# Patient Record
Sex: MALE | Race: WHITE | Employment: OTHER | URBAN - METROPOLITAN AREA
[De-identification: names, ages, dates, MRNs, and addresses within clinical notes are randomized per-mention and may not be internally consistent; named-entity substitution may affect disease eponyms.]

---

## 2018-12-01 ENCOUNTER — OFFICE VISIT (OUTPATIENT)
Dept: INTERNAL MEDICINE CLINIC | Facility: CLINIC | Age: 68
End: 2018-12-01
Payer: MEDICARE

## 2018-12-01 VITALS
WEIGHT: 178 LBS | SYSTOLIC BLOOD PRESSURE: 136 MMHG | DIASTOLIC BLOOD PRESSURE: 74 MMHG | BODY MASS INDEX: 25.48 KG/M2 | HEIGHT: 70 IN | HEART RATE: 67 BPM

## 2018-12-01 DIAGNOSIS — D69.6 THROMBOCYTOPENIA (HCC): ICD-10-CM

## 2018-12-01 DIAGNOSIS — N52.9 ERECTILE DYSFUNCTION, UNSPECIFIED ERECTILE DYSFUNCTION TYPE: ICD-10-CM

## 2018-12-01 DIAGNOSIS — Z00.00 ENCOUNTER FOR ANNUAL HEALTH EXAMINATION: ICD-10-CM

## 2018-12-01 DIAGNOSIS — Z00.00 ANNUAL PHYSICAL EXAM: Primary | ICD-10-CM

## 2018-12-01 PROCEDURE — 90653 IIV ADJUVANT VACCINE IM: CPT | Performed by: INTERNAL MEDICINE

## 2018-12-01 PROCEDURE — G0008 ADMIN INFLUENZA VIRUS VAC: HCPCS | Performed by: INTERNAL MEDICINE

## 2018-12-01 PROCEDURE — 90670 PCV13 VACCINE IM: CPT | Performed by: INTERNAL MEDICINE

## 2018-12-01 PROCEDURE — G0009 ADMIN PNEUMOCOCCAL VACCINE: HCPCS | Performed by: INTERNAL MEDICINE

## 2018-12-01 PROCEDURE — G0438 PPPS, INITIAL VISIT: HCPCS | Performed by: INTERNAL MEDICINE

## 2018-12-01 RX ORDER — IVERMECTIN 10 MG/G
1 CREAM TOPICAL
COMMUNITY
Start: 2018-11-12 | End: 2020-12-04

## 2018-12-01 RX ORDER — MINOCYCLINE HYDROCHLORIDE 50 MG/1
CAPSULE ORAL
Refills: 1 | COMMUNITY
Start: 2018-11-12 | End: 2019-12-02 | Stop reason: ALTCHOICE

## 2018-12-01 NOTE — PROGRESS NOTES
HPI:   Mable Franco. is a 79year old male who presents this morning for a Medicare Initial Annual Wellness visit (Once after 12 month Medicare anniversary) . His last physical was here in February 2015.   This is his first Medicare physical.  H Ability/Status   Rikki Love. has a completely normal functional assessment! Please see flow sheet section for details.       Depression Screening (PHQ-2/PHQ-9): Over the LAST 2 WEEKS   Little interest or pleasure in doing things (over the last t Vitamins-Minerals (MULTIVITAMIN OR) Take 1 tablet by mouth daily. MEDICAL INFORMATION:   He  has a past medical history of Erectile dysfunction and Thrombocytopenia ([facility] [address] (02/2015).     He  has a past surgical history that includes vasectomy (1983) and No   I especially have trouble understanding the speech of women and children:  No I have trouble understanding the speaker in a large room such as at a meeting or place of Judaism:  No   Many people I talk to seem to mumble (or don't speak clearly):   No P CHRONIC CONDITIONS:   Ang Fitzgerald is a 79year old male who presents for a Medicare Assessment. PLAN SUMMARY:   Diagnoses and all orders for this visit:    Annual physical exam  Prevnar and high-dose influenza vaccine today.   Anticipate Pneu Colorectal Cancer Screening      Colonoscopy Screen every 10 years Colonoscopy due on 03/14/1952 Update [facility] if applicable    Flex Sigmoidoscopy Screen every 10 years No results found for this or any previous visit. No flowsheet data found.

## 2018-12-01 NOTE — PATIENT INSTRUCTIONS
You received an influenza vaccine and Prevnar, the first pneumonia shot, today. You should get the second pneumonia shot, Pneumovax, in 1 year. Please also obtain the 2 vaccine series of Shingrix at your local pharmacy.   Continue to follow a healthy diet found for this or any previous visit.  Limited to patients who meet one of the following criteria:   • Men who are 73-68 years old and have smoked more than 100 cigarettes in their lifetime   • Anyone with a family history    Colorectal Cancer Screening Cov retarded   Persons who live in the same house as a HepB virus carrier   Homosexual men   Illicit injectable drug abusers     Tetanus Toxoid- Only covered with a cut with metal- TD and TDaP Not covered by Medicare Part B) No orders found for this or any pre

## 2018-12-06 ENCOUNTER — HOSPITAL ENCOUNTER (OUTPATIENT)
Age: 68
Discharge: HOME | End: 2018-12-06
Attending: FAMILY MEDICINE
Payer: MEDICARE

## 2018-12-06 ENCOUNTER — APPOINTMENT (OUTPATIENT)
Dept: GENERAL RADIOLOGY | Age: 68
End: 2018-12-06
Attending: FAMILY MEDICINE
Payer: MEDICARE

## 2018-12-06 VITALS
RESPIRATION RATE: 17 BRPM | WEIGHT: 175 LBS | HEART RATE: 65 BPM | TEMPERATURE: 98 F | SYSTOLIC BLOOD PRESSURE: 136 MMHG | BODY MASS INDEX: 25.05 KG/M2 | OXYGEN SATURATION: 100 % | DIASTOLIC BLOOD PRESSURE: 74 MMHG | HEIGHT: 70 IN

## 2018-12-06 DIAGNOSIS — M10.9 ACUTE GOUT INVOLVING TOE OF LEFT FOOT, UNSPECIFIED CAUSE: Primary | ICD-10-CM

## 2018-12-06 PROCEDURE — 73660 X-RAY EXAM OF TOE(S): CPT | Performed by: FAMILY MEDICINE

## 2018-12-06 PROCEDURE — 99214 OFFICE O/P EST MOD 30 MIN: CPT

## 2018-12-06 PROCEDURE — 99213 OFFICE O/P EST LOW 20 MIN: CPT

## 2018-12-06 RX ORDER — PREDNISONE 20 MG/1
40 TABLET ORAL DAILY
Qty: 10 TABLET | Refills: 0 | Status: SHIPPED | OUTPATIENT
Start: 2018-12-06 | End: 2018-12-11

## 2018-12-06 NOTE — ED INITIAL ASSESSMENT (HPI)
Patient complains of left foot pain that started less than 24 hours ago. Patient denies any trauma to the area. Patient states pain is right under big toe. Painful when he puts pressure on it such as walking. Area is swollen. No discoloration noted.

## 2018-12-06 NOTE — ED PROVIDER NOTES
Patient Seen in: [address]    History   Patient presents with: Foot Pain    Stated Complaint: left foot pain    HPI    Patient is here with left big toe pain. At the base of the toe. Since last night. Sudden onset. normal distal cap refill of the left foot. Normal peripheral pulses. Neurological: He is alert and oriented to person, place, and time. No sensory deficit. He exhibits normal muscle tone. Skin: Skin is warm. Capillary refill takes less than 2 seconds.

## 2019-11-11 ENCOUNTER — OFFICE VISIT (OUTPATIENT)
Dept: PHYSICAL THERAPY | Facility: HOSPITAL | Age: 69
End: 2019-11-11
Attending: ORTHOPAEDIC SURGERY
Payer: MEDICARE

## 2019-11-11 DIAGNOSIS — M89.8X7 EXOSTOSIS OF CALCANEUS: ICD-10-CM

## 2019-11-11 PROCEDURE — 97110 THERAPEUTIC EXERCISES: CPT

## 2019-11-11 PROCEDURE — 97161 PT EVAL LOW COMPLEX 20 MIN: CPT

## 2019-11-11 NOTE — PROGRESS NOTES
LOWER EXTREMITY EVALUATION:   Referring Physician: Dr. Caitlyn San  Diagnosis: Posterior Calcaneal Exostosis left  ICD-10 M89.8x7,  Yvan's with insertional Achilles Tendonitis  Precautions: Yvan's disease, Left  Insurance : medicare  Date of Onset: Ea Past medical history was reviewed with Will.  Significant findings include : 2006, removal of exostosis right heel, childhood osgood schlatter bilateral, Yvan's deformity bilateral,  Thrombocytopenia      Pt marked \"no\" for all questions relating to pe IR: R 30;  L 25 Flexion: R 125; L 130  Extension: R 0; L -2    DF: R 5; L 3  PF: R WNL; L WNL  INV: R WNL; L WNL  EV: R WNL; L WNL  Great toe ext: R 20; L 40         Accessory motion: decreased ankle eversion, bilateral and mid tarsal joint    Flexibility: · Patient will show improved FOTO score to  76/100 or greater.   · Patient will report reduced pain at left  foot/ankle with ADL's by greater than 80% of original max pain  · Patient will demonstrate independent HEP with good tolerance  · Patient will demon

## 2019-11-12 ENCOUNTER — IMMUNIZATION (OUTPATIENT)
Dept: INTERNAL MEDICINE CLINIC | Facility: CLINIC | Age: 69
End: 2019-11-12
Payer: MEDICARE

## 2019-11-12 DIAGNOSIS — Z23 NEED FOR VACCINATION: ICD-10-CM

## 2019-11-12 PROCEDURE — 90662 IIV NO PRSV INCREASED AG IM: CPT | Performed by: INTERNAL MEDICINE

## 2019-11-12 PROCEDURE — G0008 ADMIN INFLUENZA VIRUS VAC: HCPCS | Performed by: INTERNAL MEDICINE

## 2019-11-13 ENCOUNTER — OFFICE VISIT (OUTPATIENT)
Dept: PHYSICAL THERAPY | Facility: HOSPITAL | Age: 69
End: 2019-11-13
Attending: ORTHOPAEDIC SURGERY
Payer: MEDICARE

## 2019-11-13 PROCEDURE — 97140 MANUAL THERAPY 1/> REGIONS: CPT

## 2019-11-13 PROCEDURE — 97110 THERAPEUTIC EXERCISES: CPT

## 2019-11-13 NOTE — PROGRESS NOTES
Referring Physician: Dr. Sawyer Grandchild  Diagnosis: Posterior Calcaneal Exostosis left  ICD-10 M89.8x7,  Yvan's with insertional Achilles Tendonitis  Precautions: Yvan's disease, Left  Insurance : medicare  Date of Onset: Early October, 2019         Margaret Maguire · Patient will demonstrate improved left hip flexor, left quad, left gastroc, left soleus, left hamstring flexibility to reduce mechanical strain at left achilles with independent HEP for flexibility.    · Patient will demonstrate normalcy of left ankle hin

## 2019-11-19 ENCOUNTER — OFFICE VISIT (OUTPATIENT)
Dept: PHYSICAL THERAPY | Facility: HOSPITAL | Age: 69
End: 2019-11-19
Attending: ORTHOPAEDIC SURGERY
Payer: MEDICARE

## 2019-11-19 PROCEDURE — 97140 MANUAL THERAPY 1/> REGIONS: CPT

## 2019-11-19 PROCEDURE — 97110 THERAPEUTIC EXERCISES: CPT

## 2019-11-19 NOTE — PROGRESS NOTES
Referring Physician: Dr. Betty Salinas  Diagnosis: Posterior Calcaneal Exostosis left  ICD-10 M89.8x7,  Yvan's with insertional Achilles Tendonitis  Precautions: Yvan's disease, Left  Insurance : medicare  Date of Onset: Early October, 2019         Harish King Assessment: Good response with therapy. Patient c/o no pain with treatment, added hamstring/calf  stretch and hip abduction exercises. Patient has very tight both hamstring and calf and decreased both hip strength.  Very significant rigid cavus foot type pr

## 2019-11-21 ENCOUNTER — OFFICE VISIT (OUTPATIENT)
Dept: PHYSICAL THERAPY | Facility: HOSPITAL | Age: 69
End: 2019-11-21
Attending: ORTHOPAEDIC SURGERY
Payer: MEDICARE

## 2019-11-21 PROCEDURE — 97110 THERAPEUTIC EXERCISES: CPT

## 2019-11-21 NOTE — PROGRESS NOTES
Referring Physician: Dr. Marzena Johnson  Diagnosis: Posterior Calcaneal Exostosis left  ICD-10 M89.8x7,  Yvan's with insertional Achilles Tendonitis  Precautions: Yvan's disease, Left  Insurance : medicare  Date of Onset: Early October, 2019         Matias Pineda Manual therapy;   -posterior PA glides talocrural joint left  -long axis distraction left ankle  -gentle STM to left achilles  -mid tarsel mobilizations grade III  -metatarsel mobs A/P   Manual therapy;   -posterior PA glides talocrural joint left Plan: Continued to PT for left ankle ROM, stretching, Strengthening, continue joint mobility, global hip strengthening and HEP training.   Charges: TE x 3  Total Timed Treatment: 45 min   Total Treatment Time: 47 min

## 2019-11-27 ENCOUNTER — OFFICE VISIT (OUTPATIENT)
Dept: PHYSICAL THERAPY | Facility: HOSPITAL | Age: 69
End: 2019-11-27
Attending: ORTHOPAEDIC SURGERY
Payer: MEDICARE

## 2019-11-27 PROCEDURE — 97110 THERAPEUTIC EXERCISES: CPT

## 2019-11-27 NOTE — PROGRESS NOTES
Referring Physician: Dr. Sada Newton  Diagnosis: Posterior Calcaneal Exostosis left  ICD-10 M89.8x7,  Yvan's with insertional Achilles Tendonitis  Precautions: Yvan's disease, Left  Insurance : medicare  Date of Onset: Early October, 2019         Odette Oglesby Beltran hopper A/P, M/L x15 ea  -Standing calf stretch 2 x 15 sec hold    -Seated DF/PF x 20  -Seated ankle CW/CCW  x15  -Seated toe crunch x 15    Manual therapy;   -posterior PA glides talocrural joint left  -long axis distraction left ankle  -gentle STM

## 2019-12-02 ENCOUNTER — OFFICE VISIT (OUTPATIENT)
Dept: INTERNAL MEDICINE CLINIC | Facility: CLINIC | Age: 69
End: 2019-12-02
Payer: MEDICARE

## 2019-12-02 VITALS
HEART RATE: 66 BPM | DIASTOLIC BLOOD PRESSURE: 70 MMHG | BODY MASS INDEX: 25.03 KG/M2 | HEIGHT: 69 IN | SYSTOLIC BLOOD PRESSURE: 132 MMHG | WEIGHT: 169 LBS

## 2019-12-02 DIAGNOSIS — Z12.5 PROSTATE CANCER SCREENING: ICD-10-CM

## 2019-12-02 DIAGNOSIS — Z00.00 ANNUAL PHYSICAL EXAM: Primary | ICD-10-CM

## 2019-12-02 DIAGNOSIS — Z00.00 ENCOUNTER FOR ANNUAL HEALTH EXAMINATION: ICD-10-CM

## 2019-12-02 DIAGNOSIS — R03.0 ELEVATED BLOOD PRESSURE READING: ICD-10-CM

## 2019-12-02 DIAGNOSIS — D69.6 THROMBOCYTOPENIA (HCC): ICD-10-CM

## 2019-12-02 PROCEDURE — 90732 PPSV23 VACC 2 YRS+ SUBQ/IM: CPT | Performed by: INTERNAL MEDICINE

## 2019-12-02 PROCEDURE — G0009 ADMIN PNEUMOCOCCAL VACCINE: HCPCS | Performed by: INTERNAL MEDICINE

## 2019-12-02 PROCEDURE — G0439 PPPS, SUBSEQ VISIT: HCPCS | Performed by: INTERNAL MEDICINE

## 2019-12-02 NOTE — PATIENT INSTRUCTIONS
ICU End of Shift Summary.  For vital signs and complete assessments, please see documentation flowsheets.     Pertinent assessments: Pt pulling bipap of repeatedly during the night.  Stated that he is done, wants to die-writer explained that a discussion need to occur between patient, wife and MD.  BIpap or NC 2 LPM, LS diminished.  Edema 1-2+ everywhere.  Good urine output.  Major Shift Events: up to BSC with 2  Plan (Upcoming Events): try to wean off bipap  Discharge/Transfer Needs: ?LTACH vs comfort care    Bedside Shift Report Completed : y  Bedside Safety Check Completed: y    .   Your physical today was normal.  You received a Pneumovax, the second pneumonia shot, today. Please obtain blood tests soon when you can. Continue healthy diet and regular exercise.   Annual Medicare physical.  Mary Douglas Jr.'s SCREENING SCHEDULE years old and have smoked more than 100 cigarettes in their lifetime   • Anyone with a family history    Colorectal Cancer Screening Covered up to Age 76     Colonoscopy Screen   Covered every 10 years- more often if abnormal Colonoscopy due on 03/14/2002 HepB virus carrier   Homosexual men   Illicit injectable drug abusers     Tetanus Toxoid- Only covered with a cut with metal- TD and TDaP Not covered by Medicare Part B) No orders found for this or any previous visit.  This may be covered with your prescrip

## 2019-12-02 NOTE — PROGRESS NOTES
HPI:   Mable Franco. is a 81year old male who presents for a Medicare Subsequent Annual Wellness visit (Pt already had Initial Annual Wellness). His last visit here was for a Medicare physical December 2018. Lately he has been feeling well. Care Team: Patient Care Team:  Nadiya Medrano MD as PCP - General (Internal Medicine)  Blessing Cade [facility]  Kelsie Leon (SURGERY, [facility])  Debo Napoles, PT as Physical Therapist (Physical Therapy)  Yelitza Busby PTA as Physical Ther SYSTEMS:   GENERAL: No fever  LUNGS: No cough wheezing or shortness of breath  CARDIAC: No lightheadedness palpitations or chest pain  GI: No anorexia heartburn dysphagia nausea vomiting abdominal pain diarrhea constipation or rectal bleeding  : No urina Visual Acuity  Right Eye Visual Acuity: Corrected Right Eye Chart Acuity: 20/25   Left Eye Visual Acuity: Corrected Left Eye Chart Acuity: 20/40   Both Eyes Visual Acuity: Corrected Both Eyes Chart Acuity: 20/25   Able To Tolerate Visual Acuity: Yes DIFFERENTIAL; Future    Elevated blood pressure reading  Initial BP today high. Repeat BP normal.  -     COMP METABOLIC PANEL (14); Future  -     LIPID PANEL;  Future    Prostate cancer screening  Await PSA  -     PSA SCREEN; Future    Other orders  - Glaucoma, AA>50, > 65 No flowsheet data found.     Prostate Cancer Screening      PSA  Annually PSA due on 02/12/2017  Update Health Maintenance if applicable     Immunizations (Update Immunization Activity if applicable)     Influenza  Covered Padmini

## 2019-12-03 ENCOUNTER — OFFICE VISIT (OUTPATIENT)
Dept: PHYSICAL THERAPY | Facility: HOSPITAL | Age: 69
End: 2019-12-03
Attending: ORTHOPAEDIC SURGERY
Payer: MEDICARE

## 2019-12-03 PROCEDURE — 97110 THERAPEUTIC EXERCISES: CPT

## 2019-12-03 NOTE — PROGRESS NOTES
Referring Physician: Dr. Caitlyn San  Diagnosis: Posterior Calcaneal Exostosis left  ICD-10 M89.8x7,  Yvan's with insertional Achilles Tendonitis  Precautions: Yvan's disease, Left  Insurance : medicare  Date of Onset: Early October, 2019         Luis Enrique Tijerina -Standing heel raises x 15  -Step hamstring stretch 4 x 30 sec hold  -Step calf stretch 10  x10 sec hold   -YIMI L3 calf stretch x 3 min  -[place] board A/P, M/L x15 ea  [place] board S/L A/P x20 L  -BAPS board SL CW/CCW x 15 ea L       Manual Therapy:    Yanira Mcmillan Charges: TE x 3  Total Timed Treatment: 45 min   Total Treatment Time: 47 min

## 2019-12-06 ENCOUNTER — OFFICE VISIT (OUTPATIENT)
Dept: PHYSICAL THERAPY | Facility: HOSPITAL | Age: 69
End: 2019-12-06
Attending: ORTHOPAEDIC SURGERY
Payer: MEDICARE

## 2019-12-06 PROCEDURE — 97110 THERAPEUTIC EXERCISES: CPT

## 2019-12-06 NOTE — PROGRESS NOTES
Referring Physician: Dr. Mejia Payer  Diagnosis: Posterior Calcaneal Exostosis left  ICD-10 M89.8x7,  Yvan's with insertional Achilles Tendonitis  Precautions: Yvan's disease, Left  Insurance : medicare  Date of Onset: Early October, 2019     Singh -[place] board A/P, M/L x15 ea  [place] board S/L A/P x20 L  -BAPS board SL CW/CCW x 15 ea L       TE:     -YIMI L3 calf  Stretch 3 x 30 secs  -BAPS board SL CW/CCW x 30 ea L and   BAPS with 12 and 6 oclock and 3 and 9 oclock with opposite foot on ground  - · Patient will demonstrate leftAnkle DF AROM to 12 degrees to allow for reduction in strain at left achilles insertion or left  foot.-Partially MET (No c/o pain with stair climbing)      Plan: Continued to PT for left ankle ROM, stretching, Strengthening,

## 2019-12-10 ENCOUNTER — OFFICE VISIT (OUTPATIENT)
Dept: PHYSICAL THERAPY | Facility: HOSPITAL | Age: 69
End: 2019-12-10
Attending: ORTHOPAEDIC SURGERY
Payer: MEDICARE

## 2019-12-10 PROCEDURE — 97110 THERAPEUTIC EXERCISES: CPT

## 2019-12-10 NOTE — PROGRESS NOTES
POSSIBLE DC NOTE  Referring Physician: Dr. Irais Agrawal  Diagnosis: Posterior Calcaneal Exostosis left  ICD-10 M89.8x7,  Yvan's with insertional Achilles Tendonitis  Precautions: Yvan's disease, Left  Insurance : medicare  Date of Onset: Early October, · Patient will demonstrate improved left hip flexor, left quad, left gastroc, left soleus, left hamstring flexibility to reduce mechanical strain at left achilles with independent HEP for flexibility. -Partially MET  · Patient will demonstrate normalcy of -Seated ankle CW/CCW  x15  -Seated toe crunch x 15 TE:    -Nu Step L6 x8 min seat #10  -Shuttle DL 50# x 5 min, calf stretch x2 min  -Standing heel raises x 15  -Step hamstring stretch 4 x 30 sec hold  -Step calf stretch 10  x10 sec hold   -YIMI L3 calf s

## 2019-12-16 NOTE — PROGRESS NOTES
POSSIBLE DC NOTE  Referring Physician: Dr. Betty Salinas  Diagnosis: Posterior Calcaneal Exostosis left  ICD-10 M89.8x7,  Yvan's with insertional Achilles Tendonitis  Precautions: Yvan's disease, Left  Insurance : medicare  Date of Onset: Early October, · Patient will demonstrate improved left hip flexor, left quad, left gastroc, left soleus, left hamstring flexibility to reduce mechanical strain at left achilles with independent HEP for flexibility. -Partially MET  · Patient will demonstrate normalcy of -Seated ankle CW/CCW  x15  -Seated toe crunch x 15 TE:    -Nu Step L6 x8 min seat #10  -Shuttle DL 50# x 5 min, calf stretch x2 min  -Standing heel raises x 15  -Step hamstring stretch 4 x 30 sec hold  -Step calf stretch 10  x10 sec hold   -YIMI L3 calf s

## 2019-12-23 ENCOUNTER — APPOINTMENT (OUTPATIENT)
Dept: LAB | Facility: HOSPITAL | Age: 69
End: 2019-12-23
Attending: INTERNAL MEDICINE
Payer: MEDICARE

## 2019-12-23 DIAGNOSIS — D69.6 THROMBOCYTOPENIA (HCC): ICD-10-CM

## 2019-12-23 DIAGNOSIS — Z12.5 PROSTATE CANCER SCREENING: ICD-10-CM

## 2019-12-23 DIAGNOSIS — R03.0 ELEVATED BLOOD PRESSURE READING: ICD-10-CM

## 2019-12-23 PROCEDURE — 36415 COLL VENOUS BLD VENIPUNCTURE: CPT

## 2019-12-23 PROCEDURE — 80053 COMPREHEN METABOLIC PANEL: CPT

## 2019-12-23 PROCEDURE — 85027 COMPLETE CBC AUTOMATED: CPT

## 2019-12-23 PROCEDURE — 80061 LIPID PANEL: CPT

## 2020-09-11 ENCOUNTER — OFFICE VISIT (OUTPATIENT)
Dept: INTERNAL MEDICINE CLINIC | Facility: CLINIC | Age: 70
End: 2020-09-11
Payer: MEDICARE

## 2020-09-11 VITALS
DIASTOLIC BLOOD PRESSURE: 78 MMHG | BODY MASS INDEX: 24.59 KG/M2 | SYSTOLIC BLOOD PRESSURE: 132 MMHG | WEIGHT: 166 LBS | HEART RATE: 68 BPM | HEIGHT: 69 IN | RESPIRATION RATE: 20 BRPM

## 2020-09-11 DIAGNOSIS — M54.2 CERVICALGIA: Primary | ICD-10-CM

## 2020-09-11 PROCEDURE — G0463 HOSPITAL OUTPT CLINIC VISIT: HCPCS | Performed by: INTERNAL MEDICINE

## 2020-09-11 PROCEDURE — 99213 OFFICE O/P EST LOW 20 MIN: CPT | Performed by: INTERNAL MEDICINE

## 2020-09-11 PROCEDURE — G0008 ADMIN INFLUENZA VIRUS VAC: HCPCS | Performed by: INTERNAL MEDICINE

## 2020-09-11 PROCEDURE — 90653 IIV ADJUVANT VACCINE IM: CPT | Performed by: INTERNAL MEDICINE

## 2020-09-11 NOTE — PROGRESS NOTES
Dollie Baumgarten. is a 78year old male. Patient presents with:  Neck Pain    HPI:   For the past 4 days he has had persistent left-sided posterior neck pain, worse with head movement. He recalls no recent injury or unusual activity.   He has been res triceps and bilateral brachioradialis. Strength 5/5 bilateral upper extremities without weakness      ASSESSMENT AND PLAN:   1. Cervicalgia  Likely musculoskeletal.  No evidence of cervical radiculopathy.   Recommend rest and heat application 2-3 times jack

## 2020-09-11 NOTE — PATIENT INSTRUCTIONS
Please rest your neck and apply heat 2-3 times daily. Schedule physical therapy. You received a high-dose flu shot today. Call if no better.

## 2020-09-29 ENCOUNTER — OFFICE VISIT (OUTPATIENT)
Dept: PHYSICAL THERAPY | Facility: HOSPITAL | Age: 70
End: 2020-09-29
Attending: INTERNAL MEDICINE
Payer: MEDICARE

## 2020-09-29 DIAGNOSIS — M54.2 CERVICALGIA: ICD-10-CM

## 2020-09-29 PROCEDURE — 97110 THERAPEUTIC EXERCISES: CPT

## 2020-09-29 PROCEDURE — 97162 PT EVAL MOD COMPLEX 30 MIN: CPT

## 2020-09-29 NOTE — PROGRESS NOTES
CERVICAL SPINE EVALUATION:   Referring Physician: Dr. Bain Common  Diagnosis: Cervicalgia (M54.2)  Date of Service: 9/29/2020     PATIENT SUMMARY   Reilly Butts. is a 78year old y/o male who presents to therapy today with complaints of L sided cent pain. Meche Mccall. will have initial interventions focus on cervical FLX and posterior chain strengthening to improve posture and decrease his pain levels.          Monda Goldberg would benefit from skilled Physical Therapy to address the above impairmen treatment diagnosis, treatment plan, expectations, and prognosis.  Pt was also provided recommendations for pt/ot/slp education: activity modifications, possible soreness after evaluation, modalities as needed [ice/heat], postural corrections, ergonomics an [phone]    Sincerely,  Electronically signed by therapist: Shaun Quiñones, PT, DPT, CSCS      [de-identified] certification required: Yes  I certify the need for these services furnished under this plan of treatment and while under my c

## 2020-10-01 ENCOUNTER — OFFICE VISIT (OUTPATIENT)
Dept: PHYSICAL THERAPY | Facility: HOSPITAL | Age: 70
End: 2020-10-01
Attending: INTERNAL MEDICINE
Payer: MEDICARE

## 2020-10-01 PROCEDURE — 97110 THERAPEUTIC EXERCISES: CPT

## 2020-10-01 PROCEDURE — 97140 MANUAL THERAPY 1/> REGIONS: CPT

## 2020-10-01 NOTE — PROGRESS NOTES
Dx: Cervicalgia (M54.2)          Authorized # of Visits:  2/10         Next MD visit: 12/4/2020   Fall Risk: Standard      Precautions: N/A         Subjective:  Pt reports compliance with HEP.  Decreased neck pain  Objective:   See below    Date   10/1/2020

## 2020-10-07 ENCOUNTER — OFFICE VISIT (OUTPATIENT)
Dept: PHYSICAL THERAPY | Facility: HOSPITAL | Age: 70
End: 2020-10-07
Attending: INTERNAL MEDICINE
Payer: MEDICARE

## 2020-10-07 PROCEDURE — 97140 MANUAL THERAPY 1/> REGIONS: CPT

## 2020-10-07 PROCEDURE — 97110 THERAPEUTIC EXERCISES: CPT

## 2020-10-07 NOTE — PROGRESS NOTES
Dx: Cervicalgia (M54.2)          Authorized # of Visits:  3/10         Next MD visit: 12/4/2020   Fall Risk: Standard      Precautions: N/A         Subjective:  Pt reports compliance with HEP.  Decreased neck pain  Objective:   See below    Date   10/1/2020

## 2020-10-08 ENCOUNTER — APPOINTMENT (OUTPATIENT)
Dept: PHYSICAL THERAPY | Facility: HOSPITAL | Age: 70
End: 2020-10-08
Attending: INTERNAL MEDICINE
Payer: MEDICARE

## 2020-10-09 ENCOUNTER — OFFICE VISIT (OUTPATIENT)
Dept: PHYSICAL THERAPY | Facility: HOSPITAL | Age: 70
End: 2020-10-09
Attending: INTERNAL MEDICINE
Payer: MEDICARE

## 2020-10-09 PROCEDURE — 97110 THERAPEUTIC EXERCISES: CPT

## 2020-10-09 NOTE — PROGRESS NOTES
Dx: Cervicalgia (M54.2)          Authorized # of Visits:  3/10         Next MD visit: 12/4/2020   Fall Risk: Standard      Precautions: N/A         Subjective:  Pt reports his chest was very sore post last session.   Objective:   See below    Date   10/1/20

## 2020-10-10 ENCOUNTER — APPOINTMENT (OUTPATIENT)
Dept: PHYSICAL THERAPY | Facility: HOSPITAL | Age: 70
End: 2020-10-10
Attending: INTERNAL MEDICINE
Payer: MEDICARE

## 2020-10-13 ENCOUNTER — OFFICE VISIT (OUTPATIENT)
Dept: PHYSICAL THERAPY | Facility: HOSPITAL | Age: 70
End: 2020-10-13
Attending: INTERNAL MEDICINE
Payer: MEDICARE

## 2020-10-13 PROCEDURE — 97110 THERAPEUTIC EXERCISES: CPT

## 2020-10-13 NOTE — PROGRESS NOTES
Dx: Cervicalgia (M54.2)          Authorized # of Visits:  5/10         Next MD visit: 12/4/2020   Fall Risk: Standard      Precautions: N/A         Subjective:  Pt reports near pain free status today   Objective:   See below    Date   10/1/2020  10/7/2020

## 2020-10-14 ENCOUNTER — APPOINTMENT (OUTPATIENT)
Dept: PHYSICAL THERAPY | Facility: HOSPITAL | Age: 70
End: 2020-10-14
Attending: INTERNAL MEDICINE
Payer: MEDICARE

## 2020-10-15 ENCOUNTER — OFFICE VISIT (OUTPATIENT)
Dept: PHYSICAL THERAPY | Facility: HOSPITAL | Age: 70
End: 2020-10-15
Attending: INTERNAL MEDICINE
Payer: MEDICARE

## 2020-10-15 PROCEDURE — 97110 THERAPEUTIC EXERCISES: CPT

## 2020-10-15 NOTE — PROGRESS NOTES
Dx: Cervicalgia (M54.2)          Authorized # of Visits:  5/10         Next MD visit: 12/4/2020   Fall Risk: Standard      Precautions: N/A         Subjective:  Pt reports pain free status.    Objective:   See below    Date   10/1/2020  10/7/2020  10/9/2020 positive health benefits of exercise.   Plan:  Continue with POC with focus on cervical FLX and thoracic mobility    Charges: Yeny 3  Total Timed Treatment: 45 min     Total Treatment Time: 45 min

## 2020-10-16 ENCOUNTER — APPOINTMENT (OUTPATIENT)
Dept: PHYSICAL THERAPY | Facility: HOSPITAL | Age: 70
End: 2020-10-16
Attending: INTERNAL MEDICINE
Payer: MEDICARE

## 2020-10-20 ENCOUNTER — OFFICE VISIT (OUTPATIENT)
Dept: PHYSICAL THERAPY | Facility: HOSPITAL | Age: 70
End: 2020-10-20
Attending: INTERNAL MEDICINE
Payer: MEDICARE

## 2020-10-20 PROCEDURE — 97110 THERAPEUTIC EXERCISES: CPT

## 2020-10-20 NOTE — PROGRESS NOTES
Dx: Cervicalgia (M54.2)          Authorized # of Visits:  7/10         Next MD visit: 12/4/2020   Fall Risk: Standard      Precautions: N/A         Subjective:  Pt reports he feels his Tspine is moving better today  Objective:   See below    Date   10/1/20 household ADL performance. [facility] Simran Casas. will report he is able to exercise with 2/10 pain or less for the positive health benefits of exercise.   Plan:  Continue with POC with focus on cervical FLX and thoracic mobility    Charges: Yeny 3  Tota

## 2020-10-21 ENCOUNTER — APPOINTMENT (OUTPATIENT)
Dept: PHYSICAL THERAPY | Facility: HOSPITAL | Age: 70
End: 2020-10-21
Attending: INTERNAL MEDICINE
Payer: MEDICARE

## 2020-10-23 ENCOUNTER — OFFICE VISIT (OUTPATIENT)
Dept: PHYSICAL THERAPY | Facility: HOSPITAL | Age: 70
End: 2020-10-23
Attending: INTERNAL MEDICINE
Payer: MEDICARE

## 2020-10-23 PROCEDURE — 97110 THERAPEUTIC EXERCISES: CPT

## 2020-10-23 NOTE — PROGRESS NOTES
Dx: Cervicalgia (M54.2)          Authorized # of Visits:  7/10         Next MD visit: 12/4/2020   Fall Risk: Standard      Precautions: N/A         Subjective:  Pt reports he feels his Tspine is moving better today  Objective:   See below    Date   10/1/20 Justin Santillan. will report he is able to complete tasks such as yard work with 2/10 pain or less to improve household ADL performance.   [facility] Justin Santillan. will report he is able to exercise with 2/10 pain or less for the positive health benefits of ex

## 2020-10-24 ENCOUNTER — APPOINTMENT (OUTPATIENT)
Dept: PHYSICAL THERAPY | Facility: HOSPITAL | Age: 70
End: 2020-10-24
Attending: INTERNAL MEDICINE
Payer: MEDICARE

## 2020-10-29 ENCOUNTER — APPOINTMENT (OUTPATIENT)
Dept: PHYSICAL THERAPY | Facility: HOSPITAL | Age: 70
End: 2020-10-29
Attending: PHYSICAL MEDICINE & REHABILITATION
Payer: MEDICARE

## 2020-10-29 ENCOUNTER — TELEPHONE (OUTPATIENT)
Dept: PHYSICAL THERAPY | Facility: HOSPITAL | Age: 70
End: 2020-10-29

## 2020-11-02 ENCOUNTER — MED REC SCAN ONLY (OUTPATIENT)
Dept: INTERNAL MEDICINE CLINIC | Facility: CLINIC | Age: 70
End: 2020-11-02

## 2020-11-04 ENCOUNTER — OFFICE VISIT (OUTPATIENT)
Dept: PHYSICAL THERAPY | Facility: HOSPITAL | Age: 70
End: 2020-11-04
Attending: PHYSICAL MEDICINE & REHABILITATION
Payer: MEDICARE

## 2020-11-04 PROCEDURE — 97110 THERAPEUTIC EXERCISES: CPT

## 2020-11-04 NOTE — PROGRESS NOTES
CERVICAL SPINE EVALUATION:   Referring Physician: Dr. Ny Beltre  Diagnosis: Cervicalgia (M54.2)  Date of Service: 11/4/2020      PATIENT SUMMARY   Romeo Snider is a 78year old y/o male who reports pain free status.  Able to complete all housework Mayraellen Maciass. is a 78year old with a diagnosis of Cervicalgia (M54.2) who has been seen 9x. Chon Sultana has achieved all LTGs and is currently pain free.  Chon Sultana is appropriate for D/C at this time, had all questions answered, HEP pt/ot/slp education: activity modifications, possible soreness after evaluation, modalities as needed [ice/heat], postural corrections, ergonomics and pain science education .   Patient was instructed in and issued a HEP for:     Home Exercise Program    [facility] for your referral. Please co-sign or sign and return this letter via fax as soon as possible to [phone].  If you have any questions, please contact me at Dept: [phone]    Sincerely,  Electronically signed by therapist: Nolvia Song

## 2020-12-04 ENCOUNTER — OFFICE VISIT (OUTPATIENT)
Dept: INTERNAL MEDICINE CLINIC | Facility: CLINIC | Age: 70
End: 2020-12-04
Payer: MEDICARE

## 2020-12-04 VITALS
BODY MASS INDEX: 24.64 KG/M2 | DIASTOLIC BLOOD PRESSURE: 74 MMHG | HEIGHT: 69 IN | SYSTOLIC BLOOD PRESSURE: 120 MMHG | WEIGHT: 166.38 LBS | RESPIRATION RATE: 18 BRPM | HEART RATE: 70 BPM

## 2020-12-04 DIAGNOSIS — Z00.00 ENCOUNTER FOR ANNUAL HEALTH EXAMINATION: ICD-10-CM

## 2020-12-04 DIAGNOSIS — Z12.5 PROSTATE CANCER SCREENING: ICD-10-CM

## 2020-12-04 DIAGNOSIS — M54.2 CERVICALGIA: ICD-10-CM

## 2020-12-04 DIAGNOSIS — D69.6 THROMBOCYTOPENIA (HCC): ICD-10-CM

## 2020-12-04 DIAGNOSIS — Z00.00 ANNUAL PHYSICAL EXAM: Primary | ICD-10-CM

## 2020-12-04 PROCEDURE — 99213 OFFICE O/P EST LOW 20 MIN: CPT | Performed by: INTERNAL MEDICINE

## 2020-12-04 PROCEDURE — G0439 PPPS, SUBSEQ VISIT: HCPCS | Performed by: INTERNAL MEDICINE

## 2020-12-04 PROCEDURE — G0463 HOSPITAL OUTPT CLINIC VISIT: HCPCS | Performed by: INTERNAL MEDICINE

## 2020-12-04 RX ORDER — MELOXICAM 7.5 MG/1
TABLET ORAL
COMMUNITY
Start: 2020-10-15 | End: 2020-12-04

## 2020-12-04 RX ORDER — MELOXICAM 7.5 MG/1
TABLET ORAL
COMMUNITY
Start: 2020-09-16 | End: 2020-12-04

## 2020-12-04 RX ORDER — FLUOROURACIL 50 MG/G
CREAM TOPICAL
COMMUNITY
Start: 2020-10-27 | End: 2020-12-04 | Stop reason: ALTCHOICE

## 2020-12-04 RX ORDER — METRONIDAZOLE 7.5 MG/G
LOTION TOPICAL
COMMUNITY
Start: 2020-10-30 | End: 2022-01-04 | Stop reason: ALTCHOICE

## 2020-12-04 NOTE — PROGRESS NOTES
HPI:   Radha Cook. is a 78year old male who presents this morning for a Medicare Subsequent Annual Wellness visit (Pt already had Initial Annual Wellness). His last Medicare physical was December 2019. Lately he has been feeling well.   No s has never smoked tobacco.    CAGE Alcohol screening   Mable Franco. was screened for Alcohol abuse and had a score of 0 so is at low risk.      Patient Care Team: Patient Care Team:  Kirt Diaz MD as PCP - General (Internal Medicine)  Dayday Meyers his sister; Stroke in his mother; Tongue Cancer in his mother. SOCIAL HISTORY:   He  reports that he has never smoked. He has never used smokeless tobacco. He reports that he does not drink alcohol or use drugs.      REVIEW OF SYSTEMS:     REVIEW OF SYSTE No I avoid social activities because I cannot hear well and fear I will reply improperly: No   Family members and friends have told me they think I may have hearing loss:  No                  Visual Acuity  Right Eye Visual Acuity: Corrected Right Eye Chart agreement, screening PSA when able. Orders sent. Reinforced healthy diet and regular exercise, maintaining current body weight. Annual physical.  -     COMP METABOLIC PANEL (14); Future  -     CBC, PLATELET; NO DIFFERENTIAL;  Future  -     LIPID PANEL; F No results found for: FOB No flowsheet data found. Glaucoma Screening      Ophthalmology Visit Annually: Diabetics, FHx Glaucoma, AA>50, > 65 No flowsheet data found.     Prostate Cancer Screening      PSA  Annually PSA due on 12/23/2021  Update

## 2020-12-04 NOTE — PATIENT INSTRUCTIONS
Please get blood tests done soon when you can. Please get 2 doses of Shingrix vaccine at your local pharmacy. Continue healthy diet and regular exercise.   Annual physical.  Waldemar Mendoza Jr.'s SCREENING SCHEDULE   Tests on this list are recommended cigarettes in their lifetime   • Anyone with a family history    Colorectal Cancer Screening Covered up to Age 76     Colonoscopy Screen   Covered every 10 years- more often if abnormal Colonoscopy due on 06/02/2025 Update [facility] if applicable Illicit injectable drug abusers     Tetanus Toxoid- Only covered with a cut with metal- TD and TDaP Not covered by Medicare Part B) No orders found for this or any previous visit.  This may be covered with your prescription benefits, but Medicare does not

## 2020-12-24 ENCOUNTER — LAB ENCOUNTER (OUTPATIENT)
Dept: LAB | Facility: HOSPITAL | Age: 70
End: 2020-12-24
Attending: INTERNAL MEDICINE
Payer: MEDICARE

## 2020-12-24 DIAGNOSIS — Z00.00 ANNUAL PHYSICAL EXAM: ICD-10-CM

## 2020-12-24 DIAGNOSIS — Z12.5 PROSTATE CANCER SCREENING: ICD-10-CM

## 2020-12-24 PROCEDURE — 80053 COMPREHEN METABOLIC PANEL: CPT

## 2020-12-24 PROCEDURE — 36415 COLL VENOUS BLD VENIPUNCTURE: CPT

## 2020-12-24 PROCEDURE — 85027 COMPLETE CBC AUTOMATED: CPT

## 2020-12-24 PROCEDURE — 80061 LIPID PANEL: CPT

## 2021-03-15 DIAGNOSIS — Z23 NEED FOR VACCINATION: ICD-10-CM

## 2021-11-09 ENCOUNTER — TELEPHONE (OUTPATIENT)
Dept: INTERNAL MEDICINE CLINIC | Facility: CLINIC | Age: 71
End: 2021-11-09

## 2021-11-09 DIAGNOSIS — Z00.00 ANNUAL PHYSICAL EXAM: Primary | ICD-10-CM

## 2021-11-09 DIAGNOSIS — Z12.5 PROSTATE CANCER SCREENING: ICD-10-CM

## 2021-11-09 NOTE — TELEPHONE ENCOUNTER
Patient is requesting lab orders before his physical on 1/4/22. Please send [employer] message when orders are placed.

## 2021-11-09 NOTE — TELEPHONE ENCOUNTER
Dr. Ezequiel Guajardo, patient is requesting labs prior to physical scheduled for 12/24/21.  Please advise,

## 2021-11-14 ENCOUNTER — PATIENT MESSAGE (OUTPATIENT)
Dept: GASTROENTEROLOGY | Facility: CLINIC | Age: 71
End: 2021-11-14

## 2021-11-16 NOTE — TELEPHONE ENCOUNTER
From: Tello Dominguez.   To: JORGE Ram  Sent: 11/14/2021 1:37 PM CST  Subject: New GI symptoms    Im scheduled to see you 12/29, however, its my 3rd week of abdominal bloating, intermittent constipation and no longer have a daily urge to de

## 2021-12-15 NOTE — H&P
[address] [facility] [address] Gastroenterology                                                                                                  Clinic History and Physical     Pa PRN    History, Medications, Allergies, ROS:      Past Medical History:   Diagnosis Date   • Erectile dysfunction    • Thrombocytopenia ([facility] [address] 02/2015      Past Surgical History:   Procedure Laterality Date   • FOOT SURGERY Right 2003   • VASECTOMY  1983 behavior      PHYSICAL EXAM:   Blood pressure 137/83, pulse 78, height 5' 9\" (1.753 m), weight 161 lb (73 kg).     Gen: patient appears comfortable and in no acute distress  HEENT: conjunctiva pink, the sclera appears anicteric, oropharynx clear, mucus mem however in light of his recent symptoms and confirmation of his father's colon cancer diagnosis, the patient is uncomfortable waiting until 10 years for recall.   Though screening guidelines in light of his father's age of diagnosis, would also recommend 10 indicated. Orders This Visit:  No orders of the defined types were placed in this encounter.       Meds This Visit:  Requested Prescriptions     Signed Prescriptions Disp Refills   • PEG 3350-KCl-Na Bicarb-NaCl (TRILYTE) 420 g Oral Recon Soln 400

## 2021-12-26 ENCOUNTER — LAB ENCOUNTER (OUTPATIENT)
Dept: LAB | Facility: HOSPITAL | Age: 71
End: 2021-12-26
Attending: INTERNAL MEDICINE
Payer: MEDICARE

## 2021-12-26 DIAGNOSIS — Z12.5 PROSTATE CANCER SCREENING: ICD-10-CM

## 2021-12-26 DIAGNOSIS — Z00.00 ANNUAL PHYSICAL EXAM: ICD-10-CM

## 2021-12-26 PROCEDURE — 80053 COMPREHEN METABOLIC PANEL: CPT

## 2021-12-26 PROCEDURE — 85027 COMPLETE CBC AUTOMATED: CPT

## 2021-12-26 PROCEDURE — 80061 LIPID PANEL: CPT

## 2021-12-26 PROCEDURE — 36415 COLL VENOUS BLD VENIPUNCTURE: CPT

## 2021-12-28 ENCOUNTER — TELEPHONE (OUTPATIENT)
Dept: INTERNAL MEDICINE CLINIC | Facility: CLINIC | Age: 71
End: 2021-12-28

## 2021-12-28 NOTE — TELEPHONE ENCOUNTER
Spoke to patient. He wanted to make sure his lipid panel was drawn for his upcoming annual physical with Dr. Ny Beltre. Relayed that tests are being sent out due to a shortage of something used to run the test.     He verbalized understanding.

## 2021-12-29 ENCOUNTER — OFFICE VISIT (OUTPATIENT)
Dept: GASTROENTEROLOGY | Facility: CLINIC | Age: 71
End: 2021-12-29
Payer: MEDICARE

## 2021-12-29 ENCOUNTER — TELEPHONE (OUTPATIENT)
Dept: GASTROENTEROLOGY | Facility: CLINIC | Age: 71
End: 2021-12-29

## 2021-12-29 VITALS
DIASTOLIC BLOOD PRESSURE: 83 MMHG | SYSTOLIC BLOOD PRESSURE: 137 MMHG | HEIGHT: 69 IN | WEIGHT: 161 LBS | BODY MASS INDEX: 23.85 KG/M2 | HEART RATE: 78 BPM

## 2021-12-29 DIAGNOSIS — Z80.0 FAMILY HISTORY OF COLON CANCER: ICD-10-CM

## 2021-12-29 DIAGNOSIS — K59.00 CONSTIPATION, UNSPECIFIED CONSTIPATION TYPE: Primary | ICD-10-CM

## 2021-12-29 DIAGNOSIS — Z83.71 FAMILY HISTORY OF COLONIC POLYPS: ICD-10-CM

## 2021-12-29 DIAGNOSIS — Z80.0 FHX: COLON CANCER: ICD-10-CM

## 2021-12-29 PROCEDURE — 99204 OFFICE O/P NEW MOD 45 MIN: CPT | Performed by: NURSE PRACTITIONER

## 2021-12-29 RX ORDER — POLYETHYLENE GLYCOL 3350, SODIUM CHLORIDE, SODIUM BICARBONATE, POTASSIUM CHLORIDE 420; 11.2; 5.72; 1.48 G/4L; G/4L; G/4L; G/4L
POWDER, FOR SOLUTION ORAL
Qty: 4000 ML | Refills: 0 | Status: SHIPPED | OUTPATIENT
Start: 2021-12-29 | End: 2022-01-04 | Stop reason: ALTCHOICE

## 2021-12-29 NOTE — TELEPHONE ENCOUNTER
Scheduled for:  Colonoscopy [id number]  Provider Name:  Dr Nyla Donahue  Date:  04/27/2022  Location:  [facility]  Sedation: MAC    Time:  0900 (pt is aware to arrive at 0800)  Prep:  Colyte  Meds/Allergies Reconciled?:  Physician reviewed  Diagnosis with codes:  Cons

## 2021-12-29 NOTE — PATIENT INSTRUCTIONS
-Schedule colonoscopy w/Dr. Talia Olmstead w/ [facility] or [facility]  Dx: screening, FHCC, FH colon polyps, constipation    -Eligible for NE: Yes r/t BMI < 40  -Prep: Split dose Colyte/TriLyte or equivalent  -Anti-platelets and anti-coagulants: none  -Diabetes m

## 2022-01-04 ENCOUNTER — OFFICE VISIT (OUTPATIENT)
Dept: INTERNAL MEDICINE CLINIC | Facility: CLINIC | Age: 72
End: 2022-01-04
Payer: MEDICARE

## 2022-01-04 VITALS
SYSTOLIC BLOOD PRESSURE: 118 MMHG | HEART RATE: 75 BPM | WEIGHT: 164.31 LBS | HEIGHT: 69 IN | DIASTOLIC BLOOD PRESSURE: 66 MMHG | BODY MASS INDEX: 24.34 KG/M2

## 2022-01-04 DIAGNOSIS — Z00.00 ANNUAL PHYSICAL EXAM: Primary | ICD-10-CM

## 2022-01-04 DIAGNOSIS — D69.6 THROMBOCYTOPENIA (HCC): ICD-10-CM

## 2022-01-04 DIAGNOSIS — Z00.00 ENCOUNTER FOR ANNUAL HEALTH EXAMINATION: ICD-10-CM

## 2022-01-04 PROCEDURE — G0439 PPPS, SUBSEQ VISIT: HCPCS | Performed by: INTERNAL MEDICINE

## 2022-01-04 PROCEDURE — 99213 OFFICE O/P EST LOW 20 MIN: CPT | Performed by: INTERNAL MEDICINE

## 2022-01-04 NOTE — PROGRESS NOTES
HPI:   Perlita Carrera. is a 73year old male who presents this morning for a Medicare Subsequent Annual Wellness visit (Pt already had Initial Annual Wellness). His last visit here was for a Medicare physical December 2020. He feels well.   No sp (SURGERY, ORTHOPEDIC)  Lucita Eagle, PT as Physical Therapist (Physical Therapy)  Edwar Roque, SAMRA as Physical Therapy Assistant (Physical Therapy)  Loly Borrego, PT as Physical Therapist (Physical Therapy)    Patient Active Proble lightheadedness palpitations or chest pain  GI: Intermittent abdominal bloating and constipation. No anorexia heartburn dysphagia nausea vomiting abdominal pain diarrhea or rectal bleeding  : Occasional urinary frequency related to fluid intake.   No dys EXAM:   GENERAL: Pleasant male appearing well in no distress  /66   Pulse 75   Ht 5' 9\" (1.753 m)   Wt 164 lb 4.8 oz (74.5 kg)   BMI 24.26 kg/m²   HEENT: Anicteric, conjunctiva pink  NECK: Supple without mass or thyromegaly  NODE have you lost more than 10 pounds without trying?: 2 - No  Has your appetite been poor?: No  How does the patient maintain a good energy level?: Daily Walks  How would you describe your daily physical activity?: Light  How would you describe your current h 06/02/2015    Colonoscopy due on 06/02/2025    Flexible Sigmoidoscopy   Covered every 4 years -    Fecal Occult Blood Test Covered annually -   Prostate Cancer Screening    Prostate-Specific Antigen (PSA) Annually No results found for: PSA  PSA due on 12/2

## 2022-01-04 NOTE — PATIENT INSTRUCTIONS
Your physical today was normal.  Please get 2 doses of Shingrix vaccine at your pharmacy. Continue healthy diet and try to exercise regularly. Await colonoscopy in April.   Annual Medicare physical.  Veronica Kuhn Jr.'s SCREENING SCHEDULE   Tests on Annually No results found for: PSA  PSA due on 12/26/2023   Immunizations    Influenza Covered once per flu season  Please get every year -  No recommendations at this time    Pneumococcal Each vaccine ([id number] & [id number]) covered once after 65 Prevna

## 2022-04-25 ENCOUNTER — TELEPHONE (OUTPATIENT)
Dept: PULMONOLOGY | Facility: CLINIC | Age: 72
End: 2022-04-25

## 2022-04-25 NOTE — TELEPHONE ENCOUNTER
I contacted the patient and clarified that no COVID-19 pre-procedural testing is required at this time prior to colonoscopy procedures. Verified location of his procedure. No further questions or concerns at this time.

## 2022-04-27 ENCOUNTER — HOSPITAL ENCOUNTER (OUTPATIENT)
Facility: HOSPITAL | Age: 72
Setting detail: HOSPITAL OUTPATIENT SURGERY
Discharge: HOME | End: 2022-04-27
Attending: INTERNAL MEDICINE | Admitting: INTERNAL MEDICINE
Payer: MEDICARE

## 2022-04-27 ENCOUNTER — ANESTHESIA EVENT (OUTPATIENT)
Dept: ENDOSCOPY | Facility: HOSPITAL | Age: 72
End: 2022-04-27
Payer: MEDICARE

## 2022-04-27 ENCOUNTER — ANESTHESIA (OUTPATIENT)
Dept: ENDOSCOPY | Facility: HOSPITAL | Age: 72
End: 2022-04-27
Payer: MEDICARE

## 2022-04-27 VITALS
HEART RATE: 75 BPM | DIASTOLIC BLOOD PRESSURE: 61 MMHG | OXYGEN SATURATION: 98 % | HEIGHT: 70 IN | BODY MASS INDEX: 22.62 KG/M2 | WEIGHT: 158 LBS | SYSTOLIC BLOOD PRESSURE: 116 MMHG | RESPIRATION RATE: 15 BRPM | TEMPERATURE: 98 F

## 2022-04-27 DIAGNOSIS — Z80.0 FHX: COLON CANCER: ICD-10-CM

## 2022-04-27 DIAGNOSIS — K59.00 CONSTIPATION, UNSPECIFIED CONSTIPATION TYPE: ICD-10-CM

## 2022-04-27 DIAGNOSIS — Z83.71 FAMILY HISTORY OF COLONIC POLYPS: ICD-10-CM

## 2022-04-27 PROCEDURE — G0105 COLORECTAL SCRN; HI RISK IND: HCPCS | Performed by: INTERNAL MEDICINE

## 2022-04-27 PROCEDURE — 0DJD8ZZ INSPECTION OF LOWER INTESTINAL TRACT, VIA NATURAL OR ARTIFICIAL OPENING ENDOSCOPIC: ICD-10-PCS | Performed by: INTERNAL MEDICINE

## 2022-04-27 RX ORDER — SODIUM CHLORIDE, SODIUM LACTATE, POTASSIUM CHLORIDE, CALCIUM CHLORIDE 600; 310; 30; 20 MG/100ML; MG/100ML; MG/100ML; MG/100ML
INJECTION, SOLUTION INTRAVENOUS CONTINUOUS
OUTPATIENT
Start: 2022-04-27

## 2022-04-27 RX ORDER — SODIUM CHLORIDE, SODIUM LACTATE, POTASSIUM CHLORIDE, CALCIUM CHLORIDE 600; 310; 30; 20 MG/100ML; MG/100ML; MG/100ML; MG/100ML
INJECTION, SOLUTION INTRAVENOUS CONTINUOUS
Status: DISCONTINUED | OUTPATIENT
Start: 2022-04-27 | End: 2022-04-27

## 2022-04-27 RX ORDER — NALOXONE HYDROCHLORIDE 0.4 MG/ML
80 INJECTION, SOLUTION INTRAMUSCULAR; INTRAVENOUS; SUBCUTANEOUS AS NEEDED
OUTPATIENT
Start: 2022-04-27 | End: 2022-04-27

## 2022-04-27 RX ADMIN — SODIUM CHLORIDE, SODIUM LACTATE, POTASSIUM CHLORIDE, CALCIUM CHLORIDE: 600; 310; 30; 20 INJECTION, SOLUTION INTRAVENOUS at 09:37:00

## 2022-04-27 RX ADMIN — SODIUM CHLORIDE, SODIUM LACTATE, POTASSIUM CHLORIDE, CALCIUM CHLORIDE: 600; 310; 30; 20 INJECTION, SOLUTION INTRAVENOUS at 10:04:00

## 2022-04-27 NOTE — ANESTHESIA POSTPROCEDURE EVALUATION
Patient: Ed Owusu. Procedure Summary     Date: 04/27/22 Room / Location: [address] [place] [address] ENDOSCOPY 04 / [facility] ENDOSCOPY    Anesthesia Start: 4683 Anesthesia Stop: 1009    Procedure: COLONOSCOPY (N/A ) Diagnosis:       Constipation, unspecified constipation type      Family history of colonic polyps      FHx: colon cancer      (normal colon)    Surgeons: Kristin Shaw MD Anesthesiologist: Julio Briggs CRNA    Anesthesia Type: MAC ASA Status: 2          Anesthesia Type: MAC    Vitals Value Taken Time   /59 04/27/22 1015   Temp 98f 04/27/22 1024   Pulse 64 04/27/22 1023   Resp 11 04/27/22 1023   SpO2 100 % 04/27/22 1023   Vitals shown include unvalidated device data.     [address] [place] [address] AN Post Evaluation:   Patient Evaluated in PACU (gi)  Patient Participation: complete - patient participated  Level of Consciousness: awake and sleepy but conscious  Pain Score: 0  Pain Management: adequate  Airway Patency:patent  Dental exam unchanged from preop  Yes    Cardiovascular Status: acceptable  Respiratory Status: acceptable and room air  Postoperative Hydration acceptable      Cat Diamond CRNA  4/27/2022 10:24 AM

## 2022-04-27 NOTE — OPERATIVE REPORT
[facility] Endoscopy Report      Date of Procedure:  04/27/22      Preoperative Diagnosis:  1. Colorectal cancer screening  2. Family history of colon polyps and abdominal malignancy      Postoperative Diagnosis:  Normal colonoscopy      Procedure:    Screening colonoscopy      Surgeon:  Bianca Keenan M.D. Anesthesia:  Monitored anesthesia care  Cecal withdrawal time: 21 minutes  EBL: None      Brief History: This is a 81year old male who presents for a screening colonoscopy in the setting of a family history of colon polyps in his sister (6 decade) and a history of abdominal malignancy on his father. The patient's last colonoscopy is approaching 7 years prior. Following a bout of diarrhea in the fall 2021 the patient has been constipated with infrequent bowel movements and straining at the stool. Colonoscopy is being performed to evaluate. Technique:  After informed consent, the patient was placed in the left lateral recumbent position. Digital rectal examination revealed no palpable intraluminal abnormalities. An Olympus variable stiffness 190 series HD colonoscope was inserted into the rectum and advanced under direct vision by following the lumen through somewhat of a long and capacious colon to the terminal ileum. The colon was examined upon withdrawal in the left lateral recumbent position. Findings:  The preparation of the colon was very good. The terminal ileum was examined for several cm and visually normal.  The ileocecal valve was well preserved. The visualized colonic mucosa from the cecum to the anal verge was normal with an intact vascular pattern. There were no colonic polyps, definite diverticula, mass lesions, vascular anomalies or signs of inflammation seen. Retroflexion in the right, transverse colon and rectum revealed no abnormalities with the exception of incidental internal hemorrhoids in the latter.   The procedure was well tolerated without immediate complication. Impression:  Normal screening colonoscopy to the terminal ileum    Recommendations:  1. High-fiber diet and conservative measures for constipation. 2.  Probable observation unless any new symptoms or signs are noted.         Felicia Chaudhary MD  4/27/2022

## 2022-05-05 ENCOUNTER — TELEPHONE (OUTPATIENT)
Dept: GASTROENTEROLOGY | Facility: CLINIC | Age: 72
End: 2022-05-05

## 2023-01-23 ENCOUNTER — TELEPHONE (OUTPATIENT)
Dept: INTERNAL MEDICINE CLINIC | Facility: CLINIC | Age: 73
End: 2023-01-23

## 2023-01-23 DIAGNOSIS — Z12.5 PROSTATE CANCER SCREENING: ICD-10-CM

## 2023-01-23 DIAGNOSIS — D69.6 THROMBOCYTOPENIA (HCC): Primary | ICD-10-CM

## 2023-01-23 DIAGNOSIS — E78.5 DYSLIPIDEMIA: ICD-10-CM

## 2023-01-23 NOTE — TELEPHONE ENCOUNTER
Patient is scheduled for a Medicare Annual Visit on 02/17/2023.  Patient is requesting lab orders prior to appointment, including PSA

## 2023-02-14 ENCOUNTER — LAB ENCOUNTER (OUTPATIENT)
Dept: LAB | Facility: HOSPITAL | Age: 73
End: 2023-02-14
Attending: INTERNAL MEDICINE
Payer: MEDICARE

## 2023-02-14 DIAGNOSIS — E78.5 DYSLIPIDEMIA: ICD-10-CM

## 2023-02-14 DIAGNOSIS — D69.6 THROMBOCYTOPENIA (HCC): ICD-10-CM

## 2023-02-14 DIAGNOSIS — Z12.5 PROSTATE CANCER SCREENING: ICD-10-CM

## 2023-02-14 LAB
ALBUMIN SERPL-MCNC: 3.7 G/DL (ref 3.4–5)
ALBUMIN/GLOB SERPL: 1.2 {RATIO} (ref 1–2)
ALP LIVER SERPL-CCNC: 56 U/L
ALT SERPL-CCNC: 24 U/L
ANION GAP SERPL CALC-SCNC: 6 MMOL/L (ref 0–18)
AST SERPL-CCNC: 20 U/L (ref 15–37)
BILIRUB SERPL-MCNC: 0.7 MG/DL (ref 0.1–2)
BUN BLD-MCNC: 21 MG/DL (ref 7–18)
BUN/CREAT SERPL: 22.6 (ref 10–20)
CALCIUM BLD-MCNC: 8.9 MG/DL (ref 8.5–10.1)
CHLORIDE SERPL-SCNC: 107 MMOL/L (ref 98–112)
CHOLEST SERPL-MCNC: 157 MG/DL (ref ?–200)
CO2 SERPL-SCNC: 30 MMOL/L (ref 21–32)
COMPLEXED PSA SERPL-MCNC: 0.83 NG/ML (ref ?–4)
CREAT BLD-MCNC: 0.93 MG/DL
DEPRECATED RDW RBC AUTO: 42.4 FL (ref 35.1–46.3)
ERYTHROCYTE [DISTWIDTH] IN BLOOD BY AUTOMATED COUNT: 12.4 % (ref 11–15)
FASTING PATIENT LIPID ANSWER: YES
FASTING STATUS PATIENT QL REPORTED: YES
GFR SERPLBLD BASED ON 1.73 SQ M-ARVRAT: 88 ML/MIN/1.73M2 (ref 60–?)
GLOBULIN PLAS-MCNC: 3.2 G/DL (ref 2.8–4.4)
GLUCOSE BLD-MCNC: 94 MG/DL (ref 70–99)
HCT VFR BLD AUTO: 41.5 %
HDLC SERPL-MCNC: 54 MG/DL (ref 40–59)
HGB BLD-MCNC: 13.9 G/DL
LDLC SERPL CALC-MCNC: 91 MG/DL (ref ?–100)
MCH RBC QN AUTO: 31 PG (ref 26–34)
MCHC RBC AUTO-ENTMCNC: 33.5 G/DL (ref 31–37)
MCV RBC AUTO: 92.4 FL
NONHDLC SERPL-MCNC: 103 MG/DL (ref ?–130)
OSMOLALITY SERPL CALC.SUM OF ELEC: 299 MOSM/KG (ref 275–295)
PLATELET # BLD AUTO: 133 10(3)UL (ref 150–450)
POTASSIUM SERPL-SCNC: 4.1 MMOL/L (ref 3.5–5.1)
PROT SERPL-MCNC: 6.9 G/DL (ref 6.4–8.2)
RBC # BLD AUTO: 4.49 X10(6)UL
SODIUM SERPL-SCNC: 143 MMOL/L (ref 136–145)
TRIGL SERPL-MCNC: 58 MG/DL (ref 30–149)
VLDLC SERPL CALC-MCNC: 9 MG/DL (ref 0–30)
WBC # BLD AUTO: 5 X10(3) UL (ref 4–11)

## 2023-02-14 PROCEDURE — 80061 LIPID PANEL: CPT

## 2023-02-14 PROCEDURE — 36415 COLL VENOUS BLD VENIPUNCTURE: CPT

## 2023-02-14 PROCEDURE — 80053 COMPREHEN METABOLIC PANEL: CPT

## 2023-02-14 PROCEDURE — 85027 COMPLETE CBC AUTOMATED: CPT

## 2023-02-17 ENCOUNTER — OFFICE VISIT (OUTPATIENT)
Dept: INTERNAL MEDICINE CLINIC | Facility: CLINIC | Age: 73
End: 2023-02-17

## 2023-02-17 VITALS
SYSTOLIC BLOOD PRESSURE: 130 MMHG | HEIGHT: 69 IN | BODY MASS INDEX: 24.35 KG/M2 | HEART RATE: 66 BPM | WEIGHT: 164.38 LBS | DIASTOLIC BLOOD PRESSURE: 64 MMHG

## 2023-02-17 DIAGNOSIS — Z00.00 ENCOUNTER FOR ANNUAL HEALTH EXAMINATION: ICD-10-CM

## 2023-02-17 DIAGNOSIS — E78.5 DYSLIPIDEMIA: ICD-10-CM

## 2023-02-17 DIAGNOSIS — D69.6 THROMBOCYTOPENIA (HCC): ICD-10-CM

## 2023-02-17 DIAGNOSIS — Z00.00 ANNUAL PHYSICAL EXAM: Primary | ICD-10-CM

## 2023-04-18 ENCOUNTER — OFFICE VISIT (OUTPATIENT)
Dept: INTERNAL MEDICINE CLINIC | Facility: CLINIC | Age: 73
End: 2023-04-18

## 2023-04-18 VITALS
HEIGHT: 69 IN | WEIGHT: 166.19 LBS | SYSTOLIC BLOOD PRESSURE: 137 MMHG | DIASTOLIC BLOOD PRESSURE: 80 MMHG | HEART RATE: 65 BPM | BODY MASS INDEX: 24.61 KG/M2

## 2023-04-18 DIAGNOSIS — M54.50 ACUTE RIGHT-SIDED LOW BACK PAIN WITHOUT SCIATICA: Primary | ICD-10-CM

## 2023-04-18 PROCEDURE — 1125F AMNT PAIN NOTED PAIN PRSNT: CPT | Performed by: INTERNAL MEDICINE

## 2023-04-18 PROCEDURE — 99213 OFFICE O/P EST LOW 20 MIN: CPT | Performed by: INTERNAL MEDICINE

## 2023-04-18 RX ORDER — CYCLOBENZAPRINE HCL 10 MG
10 TABLET ORAL 3 TIMES DAILY PRN
Qty: 30 TABLET | Refills: 0 | Status: SHIPPED | OUTPATIENT
Start: 2023-04-18 | End: 2023-05-08

## 2023-04-18 RX ORDER — SACCHAROMYCES BOULARDII 250 MG
250 CAPSULE ORAL DAILY
COMMUNITY

## 2023-04-18 RX ORDER — NAPROXEN 500 MG/1
500 TABLET ORAL 2 TIMES DAILY WITH MEALS
Qty: 30 TABLET | Refills: 0 | Status: SHIPPED | OUTPATIENT
Start: 2023-04-18 | End: 2024-04-12

## 2023-04-18 NOTE — PATIENT INSTRUCTIONS
Please continue to perform light activity but avoid painful activity. Perform gentle stretching exercises for your back muscles and apply heat 2-3 times daily. Take naproxen 500 mg twice daily with meals. Take cyclobenzaprine 10 mg 3 times daily as needed, watching for drowsiness. Call if not soon better.

## 2024-01-18 ENCOUNTER — ORDER TRANSCRIPTION (OUTPATIENT)
Dept: ADMINISTRATIVE | Facility: HOSPITAL | Age: 74
End: 2024-01-18

## 2024-01-18 DIAGNOSIS — Z13.6 SCREENING FOR CARDIOVASCULAR CONDITION: Primary | ICD-10-CM

## 2024-02-15 ENCOUNTER — LAB ENCOUNTER (OUTPATIENT)
Dept: LAB | Facility: HOSPITAL | Age: 74
End: 2024-02-15
Attending: INTERNAL MEDICINE
Payer: MEDICARE

## 2024-02-15 DIAGNOSIS — Z12.5 PROSTATE CANCER SCREENING: ICD-10-CM

## 2024-02-15 DIAGNOSIS — Z00.00 ANNUAL PHYSICAL EXAM: ICD-10-CM

## 2024-02-15 LAB
ALBUMIN SERPL-MCNC: 4.2 G/DL (ref 3.2–4.8)
ALBUMIN/GLOB SERPL: 1.5 {RATIO} (ref 1–2)
ALP LIVER SERPL-CCNC: 56 U/L
ALT SERPL-CCNC: 17 U/L
ANION GAP SERPL CALC-SCNC: 1 MMOL/L (ref 0–18)
AST SERPL-CCNC: 24 U/L (ref ?–34)
BILIRUB SERPL-MCNC: 0.8 MG/DL (ref 0.2–1.1)
BUN BLD-MCNC: 21 MG/DL (ref 9–23)
BUN/CREAT SERPL: 21.2 (ref 10–20)
CALCIUM BLD-MCNC: 9.2 MG/DL (ref 8.7–10.4)
CHLORIDE SERPL-SCNC: 107 MMOL/L (ref 98–112)
CHOLEST SERPL-MCNC: 170 MG/DL (ref ?–200)
CO2 SERPL-SCNC: 32 MMOL/L (ref 21–32)
COMPLEXED PSA SERPL-MCNC: 0.73 NG/ML (ref ?–4)
CREAT BLD-MCNC: 0.99 MG/DL
DEPRECATED RDW RBC AUTO: 43.8 FL (ref 35.1–46.3)
EGFRCR SERPLBLD CKD-EPI 2021: 81 ML/MIN/1.73M2 (ref 60–?)
ERYTHROCYTE [DISTWIDTH] IN BLOOD BY AUTOMATED COUNT: 12.8 % (ref 11–15)
FASTING PATIENT LIPID ANSWER: YES
FASTING STATUS PATIENT QL REPORTED: YES
GLOBULIN PLAS-MCNC: 2.8 G/DL (ref 2.8–4.4)
GLUCOSE BLD-MCNC: 96 MG/DL (ref 70–99)
HCT VFR BLD AUTO: 42.1 %
HDLC SERPL-MCNC: 56 MG/DL (ref 40–59)
HGB BLD-MCNC: 14.4 G/DL
LDLC SERPL CALC-MCNC: 102 MG/DL (ref ?–100)
MCH RBC QN AUTO: 31.6 PG (ref 26–34)
MCHC RBC AUTO-ENTMCNC: 34.2 G/DL (ref 31–37)
MCV RBC AUTO: 92.5 FL
NONHDLC SERPL-MCNC: 114 MG/DL (ref ?–130)
OSMOLALITY SERPL CALC.SUM OF ELEC: 293 MOSM/KG (ref 275–295)
PLATELET # BLD AUTO: 136 10(3)UL (ref 150–450)
POTASSIUM SERPL-SCNC: 4.2 MMOL/L (ref 3.5–5.1)
PROT SERPL-MCNC: 7 G/DL (ref 5.7–8.2)
RBC # BLD AUTO: 4.55 X10(6)UL
SODIUM SERPL-SCNC: 140 MMOL/L (ref 136–145)
TRIGL SERPL-MCNC: 62 MG/DL (ref 30–149)
VLDLC SERPL CALC-MCNC: 10 MG/DL (ref 0–30)
WBC # BLD AUTO: 4.9 X10(3) UL (ref 4–11)

## 2024-02-15 PROCEDURE — 80053 COMPREHEN METABOLIC PANEL: CPT

## 2024-02-15 PROCEDURE — 36415 COLL VENOUS BLD VENIPUNCTURE: CPT

## 2024-02-15 PROCEDURE — 80061 LIPID PANEL: CPT

## 2024-02-15 PROCEDURE — 85027 COMPLETE CBC AUTOMATED: CPT

## 2024-02-19 ENCOUNTER — HOSPITAL ENCOUNTER (OUTPATIENT)
Dept: CT IMAGING | Facility: HOSPITAL | Age: 74
End: 2024-02-19
Attending: INTERNAL MEDICINE

## 2024-02-19 ENCOUNTER — OFFICE VISIT (OUTPATIENT)
Dept: INTERNAL MEDICINE CLINIC | Facility: CLINIC | Age: 74
End: 2024-02-19
Payer: MEDICARE

## 2024-02-19 VITALS
DIASTOLIC BLOOD PRESSURE: 70 MMHG | BODY MASS INDEX: 24.94 KG/M2 | WEIGHT: 168.38 LBS | SYSTOLIC BLOOD PRESSURE: 128 MMHG | HEART RATE: 76 BPM | HEIGHT: 69 IN

## 2024-02-19 DIAGNOSIS — Z00.00 ENCOUNTER FOR ANNUAL HEALTH EXAMINATION: ICD-10-CM

## 2024-02-19 DIAGNOSIS — Z13.6 SCREENING FOR CARDIOVASCULAR CONDITION: ICD-10-CM

## 2024-02-19 DIAGNOSIS — E78.5 DYSLIPIDEMIA: ICD-10-CM

## 2024-02-19 DIAGNOSIS — Z00.00 ANNUAL PHYSICAL EXAM: Primary | ICD-10-CM

## 2024-02-19 DIAGNOSIS — Z12.5 PROSTATE CANCER SCREENING: ICD-10-CM

## 2024-02-19 DIAGNOSIS — D69.6 THROMBOCYTOPENIA (HCC): ICD-10-CM

## 2024-02-19 PROBLEM — I25.10 ASHD (ARTERIOSCLEROTIC HEART DISEASE): Status: ACTIVE | Noted: 2024-02-19

## 2024-02-19 NOTE — PROGRESS NOTES
Subjective:   Emeterio Hawkins Jr. is a 73 year old male who presents this morning for a Medicare Subsequent Annual Wellness visit (Pt already had Initial Annual Wellness) and scheduled follow up of multiple significant but stable problems including dyslipidemia and thrombocytopenia.    His last Medicare physical was February 2023.  He has had a healthy year and feels well.  No particular issues for discussion today.  He has a cardiac CT scan scheduled later today.    In terms of his dyslipidemia, diet is healthy and he exercises 5 days weekly, cardio and weights.  Weight up 4 pounds since physical February 2023.  In terms of his mild and stable thrombocytopenia, no clinical bleeding.    In February 2024, CMP normal, CBC normal except platelet count 136,000, cholesterol 170 triglycerides 62 HDL 56 , PSA normal at 0.73.  In November 2018, glycohemoglobin 5.4%, hepatitis C antibody negative, HIV negative. Colonoscopy April 2022 normal with observation going forward recommended.  Prevnar vaccine December 2018.  Pneumovax December 2019.  Has received both Shingrix vaccines.  Received influenza vaccine this past season.  Up-to-date on COVID boosters.     History/Other:   Fall Risk Assessment:   He has been screened for Falls and is low risk.      Cognitive Assessment:   He had a completely normal cognitive assessment - see flowsheet entries     Functional Ability/Status:   Emeterio Hawkins Jr. has some abnormal functions as listed below:  He has Vision problems based on screening of functional status.       Depression Screening (PHQ-2/PHQ-9): PHQ-2 SCORE: 0  , done 2/16/2024   Last [place] Suicide Screening on 2/19/2024 was No Risk.     Advanced Directives:   He does have a Living Will but we do NOT have it on file in Epic.    He does have a POA but we do NOT have it on file in Epic.    Not discussed      Patient Active Problem List   Diagnosis    Erectile dysfunction    Thrombocytopenia (HCC)      Allergies:  He is allergic to gold salts.    Current Medications:  Outpatient Medications Marked as Taking for the 2/19/24 encounter (Office Visit) with Stephen Modi MD   Medication Sig    saccharomyces boulardii 250 MG Oral Cap Take 1 capsule (250 mg total) by mouth daily.    Multiple Vitamins-Minerals (MULTIVITAMIN OR) Take 1 tablet by mouth daily.       Medical History:  He  has a past medical history of Erectile dysfunction and Thrombocytopenia (HCC) (02/2015).  Surgical History:  He  has a past surgical history that includes vasectomy (1983); foot surgery (Right, 2003); and colonoscopy (N/A, 4/27/2022).   Family History:  His family history includes Breast Cancer in his mother; Colon Cancer in his father; Diabetes in his sister; Heart Disease in his father; Non-Hodgkin's Lymphoma in his sister; Stroke in his mother; Tongue Cancer in his mother.  Social History:  He  reports that he has never smoked. He has never used smokeless tobacco. He reports that he does not drink alcohol and does not use drugs.    Tobacco:  He has never smoked tobacco.    CAGE Alcohol Screen:   CAGE screening score of 0 on 2/16/2024, showing low risk of alcohol abuse.      Patient Care Team:  Stephen Modi MD as PCP - General (Internal Medicine)  Tae Bolton (SURGERY, ORTHOPEDIC)  Zeeshan Pineda, PT as Physical Therapist (Physical Therapy)  Carmen Tran PTA as Physical Therapy Assistant (Physical Therapy)  Andre Dos Santos, PT as Physical Therapist (Physical Therapy)    Review of Systems    REVIEW OF SYSTEMS:   GENERAL: No fever  LUNGS: No cough wheezing or shortness of breath  CARDIAC: No lightheadedness palpitations or chest pain  GI: No anorexia heartburn dysphagia nausea vomiting abdominal pain diarrhea constipation or rectal bleeding  : No urinary frequency dysuria or hematuria  MUSCULOSKELETAL: No leg swelling  NEURO: No headaches     Objective:   Physical Exam    EXAM:   GENERAL: Pleasant male appearing  well in no distress  /70   Pulse 76   Ht 5' 9\" (1.753 m)   Wt 168 lb 6.4 oz (76.4 kg)   BMI 24.87 kg/m²   HEENT: Anicteric, conjunctiva pink, oropharynx normal  NECK: Supple without mass or thyromegaly  NODES: No peripheral adenopathy  LUNGS: Resonant to percussion and clear to auscultation  CARDIAC: Rhythm regular S1 S2 normal without murmur or edema  ABDOMEN: Bowel sounds normal soft nontender without mass or hepatosplenomegaly  EXTREMITIES: Normal without cyanosis or clubbing  PULSES: Carotid upstrokes 2+ without carotid bruits, distal pulses 2+ bilateral dorsalis pedis and posterior tibial  NEURO: Reflexes 2+ bilaterally and symmetric     /70   Pulse 76   Ht 5' 9\" (1.753 m)   Wt 168 lb 6.4 oz (76.4 kg)   BMI 24.87 kg/m²  Estimated body mass index is 24.87 kg/m² as calculated from the following:    Height as of this encounter: 5' 9\" (1.753 m).    Weight as of this encounter: 168 lb 6.4 oz (76.4 kg).    Medicare Hearing Assessment:   Hearing Screening    Time taken: 2/19/2024  9:43 AM  Entry User: Chanda Coto LPN  Screening Method: Finger Rub  Finger Rub Result: Pass                     Assessment & Plan:   Emeterio Hawkins Jr. is a 73 year old male who presents for a Medicare Assessment.     1. Annual physical exam (Primary)  Prevnar 20 vaccine today.  Await results of cardiac CT scan to be done later today.  Reinforced healthy diet and regular exercise, maintaining current body weight.  Annual Medicare physical.    2. Dyslipidemia  Recent lipid profile normal.  Await cardiac CT scan for calcium score.  Discussed potential statin if calcium score elevated.    3. Thrombocytopenia (HCC)  Mild and stable.  Will continue to observe.    4. Prostate cancer screening  Recent PSA normal.    Other orders  -     PCV20 VACCINE FOR INTRAMUSCULAR USE    The patient indicates understanding of these issues and agrees to the plan.  Reinforced healthy diet, lifestyle, and exercise.      No follow-ups on  file.     Stephen Modi MD, 2/19/2024     Supplementary Documentation:   General Health:  In the past six months, have you lost more than 10 pounds without trying?: 2 - No  Has your appetite been poor?: No  Type of Diet: Balanced  How does the patient maintain a good energy level?: Appropriate Exercise  How would you describe your daily physical activity?: Moderate  How would you describe your current health state?: Good  How do you maintain positive mental well-being?: Social Interaction  On a scale of 0 to 10, with 0 being no pain and 10 being severe pain, what is your pain level?: 0 - (None)  In the past six months, have you experienced urine leakage?: 0-No  At any time do you feel concerned for the safety/well-being of yourself and/or your children, in your home or elsewhere?: No  Have you had any immunizations at another office such as Influenza, Hepatitis B, Tetanus, or Pneumococcal?: Yes        Emeterio Hawkins Jr.'s SCREENING SCHEDULE   Tests on this list are recommended by your physician but may not be covered, or covered at this frequency, by your insurer.   Please check with your insurance carrier before scheduling to verify coverage.   PREVENTATIVE SERVICES FREQUENCY &  COVERAGE DETAILS LAST COMPLETION DATE   Diabetes Screening    Fasting Blood Sugar / Glucose    One screening every 12 months if never tested or if previously tested but not diagnosed with pre-diabetes   One screening every 6 months if diagnosed with pre-diabetes Lab Results   Component Value Date    GLU 96 02/15/2024        Cardiovascular Disease Screening    Lipid Panel  Cholesterol  Lipoprotein (HDL)  Triglycerides Covered every 5 years for all Medicare beneficiaries without apparent signs or symptoms of cardiovascular disease Lab Results   Component Value Date    CHOLEST 170 02/15/2024    HDL 56 02/15/2024     (H) 02/15/2024    TRIG 62 02/15/2024         Electrocardiogram (EKG)   Covered if needed at Welcome to Medicare, and  non-screening if indicated for medical reasons -      Ultrasound Screening for Abdominal Aortic Aneurysm (AAA) Covered once in a lifetime for one of the following risk factors    Men who are 65-75 years old and have ever smoked    Anyone with a family history -     Colorectal Cancer Screening  Covered for ages 50-85; only need ONE of the following:    Colonoscopy   Covered every 10 years    Covered every 2 years if patient is at high risk or previous colonoscopy was abnormal 04/27/2022    Health Maintenance   Topic Date Due    Colorectal Cancer Screening  04/27/2032       Flexible Sigmoidoscopy   Covered every 4 years -    Fecal Occult Blood Test Covered annually -   Prostate Cancer Screening    Prostate-Specific Antigen (PSA) Annually No results found for: \"PSA\"  Health Maintenance   Topic Date Due    PSA  02/15/2026      Immunizations    Influenza Covered once per flu season  Please get every year -  Influenza Vaccine(1) due on 10/01/2023    Pneumococcal Each vaccine (Rkhudcq80 & Bognrhubh78) covered once after 65 Prevnar 13: 12/01/2018    [id number]: 12/02/2019     No recommendations at this time    Hepatitis B One screening covered for patients with certain risk factors   -  No recommendations at this time    Tetanus Toxoid Not covered by Medicare Part B unless medically necessary (cut with metal); may be covered with your pharmacy prescription benefits -    Tetanus, Diptheria and Pertusis TD and TDaP Not covered by Medicare Part B -  No recommendations at this time    Zoster Not covered by Medicare Part B; may be covered with your pharmacy  prescription benefits -  Zoster Vaccines(1 of 2) Never done

## 2024-02-19 NOTE — PATIENT INSTRUCTIONS
Your physical today was normal.  You received a Prevnar 20 vaccine today.  Continue healthy diet and regular exercise, maintaining current body weight.  Await results of heart scan.  Annual Medicare physical.  Emeterio Hawkins Jr.'s SCREENING SCHEDULE   Tests on this list are recommended by your physician but may not be covered, or covered at this frequency, by your insurer.   Please check with your insurance carrier before scheduling to verify coverage.   PREVENTATIVE SERVICES FREQUENCY &  COVERAGE DETAILS LAST COMPLETION DATE   Diabetes Screening    Fasting Blood Sugar / Glucose    One screening every 12 months if never tested or if previously tested but not diagnosed with pre-diabetes   One screening every 6 months if diagnosed with pre-diabetes Lab Results   Component Value Date    GLU 96 02/15/2024        Cardiovascular Disease Screening    Lipid Panel  Cholesterol  Lipoprotein (HDL)  Triglycerides Covered every 5 years for all Medicare beneficiaries without apparent signs or symptoms of cardiovascular disease Lab Results   Component Value Date    CHOLEST 170 02/15/2024    HDL 56 02/15/2024     (H) 02/15/2024    TRIG 62 02/15/2024         Electrocardiogram (EKG)   Covered if needed at Welcome to Medicare, and non-screening if indicated for medical reasons -      Ultrasound Screening for Abdominal Aortic Aneurysm (AAA) Covered once in a lifetime for one of the following risk factors   • Men who are 65-75 years old and have ever smoked   • Anyone with a family history -     Colorectal Cancer Screening  Covered for ages 50-85; only need ONE of the following:    Colonoscopy   Covered every 10 years    Covered every 2 years if patient is at high risk or previous colonoscopy was abnormal 04/27/2022    Health Maintenance   Topic Date Due   • Colorectal Cancer Screening  04/27/2032       Flexible Sigmoidoscopy   Covered every 4 years -    Fecal Occult Blood Test Covered annually -   Prostate Cancer Screening     Prostate-Specific Antigen (PSA) Annually No results found for: \"PSA\"  Health Maintenance   Topic Date Due   • PSA  02/15/2026      Immunizations    Influenza Covered once per flu season  Please get every year -  Influenza Vaccine(1) due on 10/01/2023    Pneumococcal Each vaccine ([id number] & [id number]) covered once after 65 Prevnar 13: 12/01/2018    [id number]: 12/02/2019     No recommendations at this time    Hepatitis B One screening covered for patients with certain risk factors   -  No recommendations at this time    Tetanus Toxoid Not covered by Medicare Part B unless medically necessary (cut with metal); may be covered with your pharmacy prescription benefits -    Tetanus, Diptheria and Pertusis TD and TDaP Not covered by Medicare Part B -  No recommendations at this time    Zoster Not covered by Medicare Part B; may be covered with your pharmacy  prescription benefits -  Zoster Vaccines(1 of 2) Never done

## 2024-02-19 NOTE — PROGRESS NOTES
Date of Service 2/19/2024    TANIA ABREU  Date of Birth 3/14/1950    Patient Age: 73 year old    PCP: Stephen Modi MD  [address] [place] [id number]    Heart Scan Consult  Preliminary Heart Scan Score: 630    Previous Screening  Heart Scan Completed Previously: No        Peripheral Vascular Scan Completed Previously: No          Risk Factors  Personal Risk Factors  Non-alterable Risk Factors: Personal History;Age;Gender;Family History  Alterable Risk Factors: Abnormal Cholesterol          Blood Pressure  Blood Pressure measurement declined during this encounter.  (Normal =< 120/80,  Elevated = 120-129/ >80,  High Stage1 130-139/80-89 , Stage2 >140/>90)    Lipid Profile  Cholesterol: 170, done on 2/15/2024.  HDL Cholesterol: 56, done on 2/15/2024.  LDL Cholesterol: 102, done on 2/15/2024.  TriGlycerides 62, done on 2/15/2024.    Cholesterol Goals  Value   Total  =< 200   HDL  = > 45 Men = > 55 Women   LDL   =< 100   Triglycerides  =< 150       Glucose and Hemoglobin A1C  Lab Results   Component Value Date    GLU 96 02/15/2024     (Normal Fasting Glucose < 100mg/dl )    Nurse Review  Risk factor information and results reviewed with Nurse: Yes    Recommended Follow Up:  Consult your physician regarding::   Final Heart Scan Report;  Discuss potential for Incidental Finding      Recommendations for Change:  Nutrition Changes: Low Saturated Fat;Low Fat Dairy;Increase Fiber    Cholesterol Modification (goal of therapy depends upon your risk):   Decrease LDL (Lousy/Bad) Ideal <100    Exercise: No Change Needed                   Repeat Heart Scan:   3 Years if Calcium Score is > 0.0;  Discuss with your Physician              [place] Recommended Resources:  Recommended Resources: Upcoming Classes, Medical Services and Health Library [email or web address];    PV Screening  Recommended PV Screening: Carotids;Abdomen;Ankle-Brachial Index (TOÑO)    Other Resources:: Educational handouts provided.      Oracio RUBIO  RN        Please Contact the Nurse Heart Line with any Questions or Concerns [phone].

## 2024-02-20 ENCOUNTER — PATIENT MESSAGE (OUTPATIENT)
Dept: INTERNAL MEDICINE CLINIC | Facility: CLINIC | Age: 74
End: 2024-02-20

## 2024-02-21 NOTE — TELEPHONE ENCOUNTER
From: Emeterio Hawkins Jr.  To: Stephen Modi  Sent: 2/20/2024 2:44 PM CST  Subject: Follow-up heart scan    Dr. Modi - Thank you for your time and response following my heart scan yesterday.    However, I would like to visit a cardiologist before finalizing a plan.     Thank- you,    Emeterio Hawkins

## 2024-06-18 ENCOUNTER — OFFICE VISIT (OUTPATIENT)
Dept: INTERNAL MEDICINE CLINIC | Facility: CLINIC | Age: 74
End: 2024-06-18

## 2024-06-18 VITALS
DIASTOLIC BLOOD PRESSURE: 76 MMHG | SYSTOLIC BLOOD PRESSURE: 120 MMHG | HEIGHT: 69 IN | BODY MASS INDEX: 24.29 KG/M2 | HEART RATE: 73 BPM | WEIGHT: 164 LBS

## 2024-06-18 DIAGNOSIS — M54.9 ACUTE RIGHT-SIDED BACK PAIN, UNSPECIFIED BACK LOCATION: Primary | ICD-10-CM

## 2024-06-18 PROCEDURE — 99213 OFFICE O/P EST LOW 20 MIN: CPT | Performed by: INTERNAL MEDICINE

## 2024-06-18 PROCEDURE — G2211 COMPLEX E/M VISIT ADD ON: HCPCS | Performed by: INTERNAL MEDICINE

## 2024-06-18 RX ORDER — NAPROXEN 500 MG/1
500 TABLET ORAL 2 TIMES DAILY WITH MEALS
Qty: 30 TABLET | Refills: 0 | Status: SHIPPED | OUTPATIENT
Start: 2024-06-18 | End: 2025-06-13

## 2024-06-18 RX ORDER — CYCLOBENZAPRINE HCL 10 MG
10 TABLET ORAL 3 TIMES DAILY PRN
Qty: 30 TABLET | Refills: 0 | Status: SHIPPED | OUTPATIENT
Start: 2024-06-18 | End: 2024-07-08

## 2024-06-18 RX ORDER — ROSUVASTATIN CALCIUM 5 MG/1
5 TABLET, COATED ORAL NIGHTLY
COMMUNITY

## 2024-06-18 NOTE — PROGRESS NOTES
Emeterio Hawkins Jr. is a 74 year old male.   Chief Complaint   Patient presents with    Back Pain     HPI:   Rakan presents this morning, accompanied by his wife, for evaluation.    Since Friday, 4 days ago, he has had persistent right-sided mid back pain, worse with movement such as twisting turning and bending.  He recalls no recent injury or unusual activity such as heavy lifting.  Pain does not radiate and he has no leg pain numbness tingling or weakness.  Pain is not pleuritic and he has no cough or shortness of breath.  No urinary symptoms except urinary frequency related to fluid intake.  He had a similar episode of back pain around Memorial Day which improved after about 5 days, only to recur on Friday.  He has been applying heat and taking naproxen and cyclobenzaprine with some improvement.  He is interested in physical therapy.      Medications reviewed, as listed below.  Since his last visit with me he saw Cardiology for evaluation of an elevated coronary artery calcium score and is now on rosuvastatin.  Current Outpatient Medications   Medication Sig Dispense Refill    rosuvastatin 5 MG Oral Tab Take 1 tablet (5 mg total) by mouth nightly.      naproxen 500 MG Oral Tab Take 1 tablet (500 mg total) by mouth 2 (two) times daily with meals. 30 tablet 0    cyclobenzaprine 10 MG Oral Tab Take 1 tablet (10 mg total) by mouth 3 (three) times daily as needed for Muscle spasms. 30 tablet 0    saccharomyces boulardii 250 MG Oral Cap Take 1 capsule (250 mg total) by mouth daily.      Multiple Vitamins-Minerals (MULTIVITAMIN OR) Take 1 tablet by mouth daily.       Allergies   Allergen Reactions    Gold Salts RASH      Past Medical History:    ASHD (arteriosclerotic heart disease)    Subclinical; cardiac CT 2-24 with calcium score 630 (LAD greater than RCA greater than LCx)    Erectile dysfunction    Thrombocytopenia (HCC)      Social History:  Social History     Socioeconomic History    Marital status:     Tobacco Use    Smoking status: Never    Smokeless tobacco: Never   Vaping Use    Vaping status: Never Used   Substance and Sexual Activity    Alcohol use: No    Drug use: No    Sexual activity: Not Currently     Partners: Female   Other Topics Concern    Caffeine Concern Yes     Comment: coffee     Social Determinants of Health      [facility]    Social Connections    [facility], [facility]    Housing Stability          EXAM:   GENERAL: Pleasant male appearing well in no distress, moving comfortably  /76 (BP Location: Right arm, Patient Position: Sitting, Cuff Size: large)   Pulse 73   Ht 5' 9\" (1.753 m)   Wt 164 lb (74.4 kg)   BMI 24.22 kg/m²   LUNGS: Resonant to percussion and clear to auscultation  CARDIAC: Rhythm regular S1-S2 normal without murmur  BACK: Mild right-sided lower thoracic paraspinal muscle tenderness.  No spinal or CVA tenderness. No muscle spasm  EXTREMITIES: Straight leg raising negative bilaterally  NEURO: Reflexes 2+ bilateral patellar and 2+ bilateral Achilles. Strength 5/5 bilateral lower extremities without weakness       ASSESSMENT AND PLAN:   1. Acute right-sided back pain, unspecified back location  Likely mid back muscle strain.  No evidence of radiculopathy or other etiology  Recommend rest with gentle stretching exercises and heat application 2-3 times daily  Continue naproxen 500 mg twice daily with meals.  Refill sent to pharmacy  Cyclobenzaprine 10 mg 3 times daily as needed, watching for drowsiness.  Prescription sent to pharmacy  Physical therapy.  Order with phone number given.  He will schedule  Call or return if no better  - PHYSICAL THERAPY - INTERNAL    The patient indicates understanding of these issues and agrees to the plan.    Stephen Modi MD  6/18/2024  11:17 AM

## 2024-06-18 NOTE — PATIENT INSTRUCTIONS
Please avoid painful activity and perform gentle stretching exercises regularly  Apply heat to the affected area 2-3 times daily  Take naproxen 500 mg twice daily with meals  Take cyclobenzaprine 10 mg 3 times daily as needed, watching for drowsiness  Schedule physical therapy and call if no better

## 2024-07-17 ENCOUNTER — OFFICE VISIT (OUTPATIENT)
Dept: PHYSICAL THERAPY | Facility: HOSPITAL | Age: 74
End: 2024-07-17
Attending: INTERNAL MEDICINE
Payer: MEDICARE

## 2024-07-17 DIAGNOSIS — M54.9 ACUTE RIGHT-SIDED BACK PAIN, UNSPECIFIED BACK LOCATION: Primary | ICD-10-CM

## 2024-07-17 PROCEDURE — 97162 PT EVAL MOD COMPLEX 30 MIN: CPT

## 2024-07-17 PROCEDURE — 97110 THERAPEUTIC EXERCISES: CPT

## 2024-07-17 NOTE — PROGRESS NOTES
SPINE EVALUATION:     Diagnosis:   Acute right-sided back pain, unspecified back location (M54.9)       Referring Provider: Stephen Modi  Date of Evaluation:    7/17/2024    Precautions:   ASHD Next MD visit:   none scheduled  Date of Surgery: n/a     PATIENT SUMMARY   Emeterio Hawkins Jr. is a 74 year old male who presents to therapy today with complaints of mid-back pain on R side. He reports a history of back pain, but the most recent episode began, in late May, around Memorial Day, subsided after a few days and then returned. Reports use of heat and Naproxen for pain relief. He reports usually when his back acts up, it is due to repetitive motions (raking, digging), and/or lifting. However, with this episode he denies lifting/falls/trauma/repetitive motions. Pain does not radiate into LE. No numbness or tingling is present.     Occupation: Retired    Pt describes pain level current 4/10, at best 2/10, at worst 10/10.   Pain Location: pt report of \"R lat\" - points to R sided musculature from T-10 to Sacrum   Pain Eases: heat, naproxen  Pain aggs: lifting, twisting, bending  Pain Time of Day: AM, evening, - changes  Current functional limitations include sitting > standing, walking, bending over, lifting, turning.     Emeterio describes prior level of function independent, with standing and walking, but unable to sit for long periods of time without getting up. Pt goals include \"to be smart about it\" (his back), learn exercises to perform at the gym.  Past medical history was reviewed with Emeterio. Significant findings include pt report of a history of Osgood Schlatter disease.  Past Medical History:   Diagnosis Date    ASHD (arteriosclerotic heart disease)     Subclinical; cardiac CT 2-24 with calcium score 630 (LAD greater than RCA greater than LCx)    Erectile dysfunction     Thrombocytopenia (HCC) 02/2015      Past Surgical History:   Procedure Laterality Date    Colonoscopy N/A 4/27/2022    Procedure:  COLONOSCOPY;  Surgeon: Guero Corey MD;  Location: [facility] ENDOSCOPY    Foot surgery Right 2003    Vasectomy  1983      Pt denies diplopia, dysarthria, dysphasia, dizziness, drop attacks, bowel/bladder changes, saddle anesthesia, and REHANA LE N/T.    ASSESSMENT  Emeterio presents to physical therapy evaluation with primary c/o R sided back pain. The results of the objective tests and measures show ROM deficits, weakness, hypomobility of thoracic and upper lumbar spine, poor flexibility and poor posture.  Functional deficits include but are not limited to sitting > standing, walking, bending over, lifting, turning. .  Signs and symptoms are consistent with diagnosis of Acute right-sided back pain, unspecified back location. Pt and PT discussed evaluation findings, pathology, POC and HEP.  Pt voiced understanding and performs HEP correctly without reported pain. Skilled Physical Therapy is medically necessary to address the above impairments and reach functional goals.     OBJECTIVE:   Observation/Posture:   Kyphotic, forward head, rounded shoulders  Sits with a posterior pelvic tilt    Neuro Screen: Light touch intact. Denies BLE N/T    Lumbar AROM: (* denotes performed with pain)  Flexion: 50  Extension: 15  Sidebending: R 15; L 10  Rotation: R 60% wnl; L 60% wnl    Accessory motion:   Thoracic hypomobility PA  Upper Lumbar hypomobility PA  Lower Lumbar - WNL - PA    Palpation:   Denies TTP B: PS, QL, Lat, RTC,     Strength: (* denotes performed with pain)  UE/Scapular LE   Shoulder Flex: R 4+/5, L 5/5  Shoulder ABD (C5): R 5/5, L 5/5  Shoulder ER: R 5/5, L 4+/5  Shoulder IR: R 5/5, L 5/5    Rhomboids: R 4/5, L 4/5  Mid trap: R 4/5; L 4/5  Lats: R 4/5* pain across back, L 4/5  Low trap: R 3/5; L 3/5   Hip flexion (L2): R 5/5; L 5/5  Hip abduction: R 4/5; L 4/5  Hip Extension: R 4/5; L 4/5   Hip ER: R 5/5; L 5/5  Hip IR: R 5/5; L 5/5  Knee Flexion: R 5/5; L 5/5   Knee extension (L3): R 5/5; L 5/5   DF (L4): R 5/5;  L 5/5  PF (S1): R 5/5; L 5/5     Flexibility:   LE   Hip Flexor: R Severe restrictions, L Severe restrictions  Hamstrings: R Severe restrictions; L Severe restrictions  Piriformis: R WNL; L WNL  Quads: R Min restrictions; L Min restrictions  Gastroc-soleus: R Min restrictions; L Min restrictions       Gait: pt ambulates on level ground with antalgia, stooped posture/forward lean, trendelenburg/waddle, and slowed charlie .  Balance: SLS R 30 sec, L 30 sec    Today’s Treatment and Response:   Pt education was provided on exam findings, treatment diagnosis, treatment plan, expectations, and prognosis. Pt was also provided recommendations for activity modifications, possible soreness after evaluation, modalities as needed [ice/heat], postural corrections, detrimental fear avoidance behaviors, and importance of remaining active  Patient was instructed in and issued a HEP for:   Access Code: [email or web address]  URL: [email or web address]  Date: 07/17/2024  Prepared by: Oleg Chavez    Exercises  - Standing Quadratus Lumborum Stretch with Doorway  - 1 x daily - 7 x weekly - 1 sets - 10 reps - 15 sec hold  - Standing Hip Flexor Stretch  - 1 x daily - 7 x weekly - 5 reps - 20 - 30 sec hold  - Supine Lower Trunk Rotation  - 1 x daily - 7 x weekly - 2 sets - 10 reps - 5 sec hold  - Sidelying Thoracic Rotation with Open Book  - 1 x daily - 7 x weekly - 1-2 sets - 10 reps - 5 sec hold    Charges: PT Eval Moderate Complexity, 2 TE      Total Timed Treatment: 25 min     Total Treatment Time: 55 min     Based on clinical rationale and outcome measures, this evaluation involved Moderate Complexity decision making due to 1-2 personal factors/comorbidities, 4+ body structures involved/activity limitations, and evolving symptoms including changing pain levels.  PLAN OF CARE:    Goals: (to be met in 10 visits)   Pt will improve transversus abdominis recruitment to perform proper isometric contraction without requiring verbal or  tactile cuing to promote advancement of therex   Pt will demonstrate good understanding of proper posture and body mechanics to decrease pain and improve spinal safety   Pt will improve lumbar spine AROM flexion to >55 deg to allow increase ease with bending forward to don shoes   Pt will have decreased paraspinal mm tension to tolerate standing >45 minutes for work and home activities   Pt will demonstrate improved core strength to be able to perform sitting with <2/10 pain   Pt will be independent and compliant with comprehensive HEP to maintain progress achieved in PT   Pt will improve hip ABD and ER strength to 4+/5 to increase ease with standing and walking       Frequency / Duration: Patient will be seen for 1-2 x/week or a total of 10 visits over a 90 day period. Treatment will include: Gait training, Manual Therapy, Neuromuscular Re-education, Therapeutic Activities, Therapeutic Exercise, and Home Exercise Program instruction    Education or treatment limitation: None  Rehab Potential:good    Oswestry Disability Index Score  Score: 46 % (7/15/2024  1:41 PM)      Patient/Family/Caregiver was advised of these findings, precautions, and treatment options and has agreed to actively participate in planning and for this course of care.    Thank you for your referral. Please co-sign or sign and return this letter via fax as soon as possible to [phone]. If you have any questions, please contact me at Dept: [phone]    Sincerely,  Electronically signed by therapist: Oleg Chavez, PT    Physician's certification required: Yes  I certify the need for these services furnished under this plan of treatment and while under my care.    X___________________________________________________ Date____________________    Certification From: 7/17/2024  To:10/15/2024

## 2024-07-19 ENCOUNTER — OFFICE VISIT (OUTPATIENT)
Dept: PHYSICAL THERAPY | Facility: HOSPITAL | Age: 74
End: 2024-07-19
Attending: INTERNAL MEDICINE
Payer: MEDICARE

## 2024-07-19 PROCEDURE — 97140 MANUAL THERAPY 1/> REGIONS: CPT

## 2024-07-19 PROCEDURE — 97110 THERAPEUTIC EXERCISES: CPT

## 2024-07-19 PROCEDURE — 97112 NEUROMUSCULAR REEDUCATION: CPT

## 2024-07-19 NOTE — PROGRESS NOTES
Diagnosis:   Acute right-sided back pain, unspecified back location (M54.9)        Referring Provider: Stephen Modi  Date of Evaluation:    7/17/2024    Precautions:   ASHD Next MD visit:   none scheduled  Date of Surgery: n/a       Insurance Primary/Secondary: MEDICARE / [employer]BS [place] INDEMNITY     # Auth Visits: 10 per POC            Subjective: Patient reports soreness post-eval and HEP.    Pain: 0/10      Objective:   Rx. See flow chart:  -----------------------------  At IE:   Observation/Posture:   Kyphotic, forward head, rounded shoulders  Sits with a posterior pelvic tilt     Neuro Screen: Light touch intact. Denies BLE N/T     Lumbar AROM: (* denotes performed with pain)  Flexion: 50  Extension: 15  Sidebending: R 15; L 10  Rotation: R 60% wnl; L 60% wnl     Accessory motion:   Thoracic hypomobility PA  Upper Lumbar hypomobility PA  Lower Lumbar - WNL - PA     Palpation:   Denies TTP B: PS, QL, Lat, RTC,      Strength: (* denotes performed with pain)  UE/Scapular LE   Shoulder Flex: R 4+/5, L 5/5  Shoulder ABD (C5): R 5/5, L 5/5  Shoulder ER: R 5/5, L 4+/5  Shoulder IR: R 5/5, L 5/5     Rhomboids: R 4/5, L 4/5  Mid trap: R 4/5; L 4/5  Lats: R 4/5* pain across back, L 4/5  Low trap: R 3/5; L 3/5    Hip flexion (L2): R 5/5; L 5/5  Hip abduction: R 4/5; L 4/5  Hip Extension: R 4/5; L 4/5            Hip ER: R 5/5; L 5/5  Hip IR: R 5/5; L 5/5  Knee Flexion: R 5/5; L 5/5            Knee extension (L3): R 5/5; L 5/5            DF (L4): R 5/5; L 5/5  PF (S1): R 5/5; L 5/5      Flexibility:   LE   Hip Flexor: R Severe restrictions, L Severe restrictions  Hamstrings: R Severe restrictions; L Severe restrictions  Piriformis: R WNL; L WNL  Quads: R Min restrictions; L Min restrictions  Gastroc-soleus: R Min restrictions; L Min restrictions         Gait: pt ambulates on level ground with antalgia, stooped posture/forward lean, trendelenburg/waddle, and slowed charlie .  Balance: SLS R 30 sec, L 30 sec    Assessment:  Thoracic and upper lumbar hypomobility noted, MT provided. Pt responds well to MT. Continued with stabilization exercises as well as mobility. Patient requires tactile cueing with TA recruitment.       Goals:    (to be met in 10 visits)   Pt will improve transversus abdominis recruitment to perform proper isometric contraction without requiring verbal or tactile cuing to promote advancement of therex   Pt will demonstrate good understanding of proper posture and body mechanics to decrease pain and improve spinal safety   Pt will improve lumbar spine AROM flexion to >55 deg to allow increase ease with bending forward to don shoes   Pt will have decreased paraspinal mm tension to tolerate standing >45 minutes for work and home activities   Pt will demonstrate improved core strength to be able to perform sitting with <2/10 pain   Pt will be independent and compliant with comprehensive HEP to maintain progress achieved in PT   Pt will improve hip ABD and ER strength to 4+/5 to increase ease with standing and walking        Plan: Patient will be seen for 1-2 x/week or a total of 10 visits over a 90 day period. Treatment will include: Gait training, Manual Therapy, Neuromuscular Re-education, Therapeutic Activities, Therapeutic Exercise, and Home Exercise Program instruction   Consider adding: DKTC w/ SB, Bridges  Date: 7/19/2024  TX#: 2/10 Date:                 TX#: 3/ Date:                 TX#: 4/ Date:                 TX#: 5/ Date:   Tx#: 6/   TE: 30'   Nu-Step lvl 1 - 5 min  SKTC 5 sec x 10 B  SB roll outs fwd & lat 5 sec x 10 each  HEP review       MT: 15'   STM - PS, Lat, QL R   Thoracic CPA grade II  Upper lower CPA grade II       NRE: 10'  PPT 5 sec x 12   TA recruit w/ abd iso w/ belt 3 sec x 10              HEP: Access Code: [email or web address]  URL: [email or web address]  Date: 07/17/2024  Prepared by: Oleg Chavez     Exercises  - Standing Quadratus Lumborum Stretch with Doorway  - 1 x daily - 7 x  weekly - 1 sets - 10 reps - 15 sec hold  - Standing Hip Flexor Stretch  - 1 x daily - 7 x weekly - 5 reps - 20 - 30 sec hold  - Supine Lower Trunk Rotation  - 1 x daily - 7 x weekly - 2 sets - 10 reps - 5 sec hold  - Sidelying Thoracic Rotation with Open Book  - 1 x daily - 7 x weekly - 1-2 sets - 10 reps - 5 sec hold    Charges: 2 TE; 1 MT; 1 NRE       Total Timed Treatment: 55 min  Total Treatment Time: 55 min

## 2024-07-22 ENCOUNTER — OFFICE VISIT (OUTPATIENT)
Dept: PHYSICAL THERAPY | Facility: HOSPITAL | Age: 74
End: 2024-07-22
Attending: INTERNAL MEDICINE
Payer: MEDICARE

## 2024-07-22 PROCEDURE — 97110 THERAPEUTIC EXERCISES: CPT

## 2024-07-22 PROCEDURE — 97140 MANUAL THERAPY 1/> REGIONS: CPT

## 2024-07-22 NOTE — PROGRESS NOTES
Diagnosis:   Acute right-sided back pain, unspecified back location (M54.9)        Referring Provider: Stephen Modi  Date of Evaluation:    7/17/2024    Precautions:   ASHD Next MD visit:   none scheduled  Date of Surgery: n/a     Insurance Primary/Secondary: MEDICARE / [place] INDEMNITY     # Auth Visits: 10 per POC            Subjective: Today is feeling pretty good, feels that therapy has helped to \"quiet\" the area of spasm on R sided low back. Notices that the spasm pain is more pronounced when he's doing repetitive movement, like raking or shoveling.     Pain: 0/10      Objective:   Rx. See flow chart:  -----------------------------  At IE:   Observation/Posture:   Kyphotic, forward head, rounded shoulders  Sits with a posterior pelvic tilt     Neuro Screen: Light touch intact. Denies BLE N/T     Lumbar AROM: (* denotes performed with pain)  Flexion: 50  Extension: 15  Sidebending: R 15; L 10  Rotation: R 60% wnl; L 60% wnl     Accessory motion:   Thoracic hypomobility PA  Upper Lumbar hypomobility PA  Lower Lumbar - WNL - PA     Palpation:   Denies TTP B: PS, QL, Lat, RTC,      Strength: (* denotes performed with pain)  UE/Scapular LE   Shoulder Flex: R 4+/5, L 5/5  Shoulder ABD (C5): R 5/5, L 5/5  Shoulder ER: R 5/5, L 4+/5  Shoulder IR: R 5/5, L 5/5     Rhomboids: R 4/5, L 4/5  Mid trap: R 4/5; L 4/5  Lats: R 4/5* pain across back, L 4/5  Low trap: R 3/5; L 3/5    Hip flexion (L2): R 5/5; L 5/5  Hip abduction: R 4/5; L 4/5  Hip Extension: R 4/5; L 4/5            Hip ER: R 5/5; L 5/5  Hip IR: R 5/5; L 5/5  Knee Flexion: R 5/5; L 5/5            Knee extension (L3): R 5/5; L 5/5            DF (L4): R 5/5; L 5/5  PF (S1): R 5/5; L 5/5      Flexibility:   LE   Hip Flexor: R Severe restrictions, L Severe restrictions  Hamstrings: R Severe restrictions; L Severe restrictions  Piriformis: R WNL; L WNL  Quads: R Min restrictions; L Min restrictions  Gastroc-soleus: R Min restrictions; L Min restrictions          Gait: pt ambulates on level ground with antalgia, stooped posture/forward lean, trendelenburg/waddle, and slowed charlie .  Balance: SLS R 30 sec, L 30 sec    Assessment: Promoting lumbar and thoracic extension for improving posture with good tolerance followed by strength training to maintain improvements. Pt responding well to manual therapy, no change to HEP at this time.       Goals:    (to be met in 10 visits)   Pt will improve transversus abdominis recruitment to perform proper isometric contraction without requiring verbal or tactile cuing to promote advancement of therex   Pt will demonstrate good understanding of proper posture and body mechanics to decrease pain and improve spinal safety   Pt will improve lumbar spine AROM flexion to >55 deg to allow increase ease with bending forward to don shoes   Pt will have decreased paraspinal mm tension to tolerate standing >45 minutes for work and home activities   Pt will demonstrate improved core strength to be able to perform sitting with <2/10 pain   Pt will be independent and compliant with comprehensive HEP to maintain progress achieved in PT   Pt will improve hip ABD and ER strength to 4+/5 to increase ease with standing and walking      Plan: Patient will be seen for 1-2 x/week or a total of 10 visits over a 90 day period. Treatment will include: Gait training, Manual Therapy, Neuromuscular Re-education, Therapeutic Activities, Therapeutic Exercise, and Home Exercise Program instruction   Consider adding: DKTC w/ SB, Bridges, scapular strengthening  Date: 7/19/2024  TX#: 2/10 Date: 7/22/2024                 TX#: 3/10 Date:                 TX#: 4/ Date:                 TX#: 5/ Date:   Tx#: 6/   TE: 30'   Nu-Step lvl 1 - 5 min  SKTC 5 sec x 10 B  SB roll outs fwd & lat 5 sec x 10 each  HEP review TE: 33'   Nu-Step lvl 2 - 5 min  SB roll outs fwd & lat 5 sec x 10 each  Prone Press up to tolerance x12   Seated thoracic extension, x15   Bridging, 2x10  SB  DKTC, 2x10  Standing rows, GTB B 2x10      MT: 15'   STM - PS, Lat, QL R   Thoracic CPA grade II  Upper lower CPA grade II MT: 12'  STM - PS, Lat, QL R   Thoracic CPA grade II  Upper lower CPA grade II      NRE: 10'  PPT 5 sec x 12   TA recruit w/ abd iso w/ belt 3 sec x 10              HEP: Access Code: [email or web address]  URL: [email or web address]  Date: 07/17/2024  Prepared by: Oleg Chavez     Exercises  - Standing Quadratus Lumborum Stretch with Doorway  - 1 x daily - 7 x weekly - 1 sets - 10 reps - 15 sec hold  - Standing Hip Flexor Stretch  - 1 x daily - 7 x weekly - 5 reps - 20 - 30 sec hold  - Supine Lower Trunk Rotation  - 1 x daily - 7 x weekly - 2 sets - 10 reps - 5 sec hold  - Sidelying Thoracic Rotation with Open Book  - 1 x daily - 7 x weekly - 1-2 sets - 10 reps - 5 sec hold    Charges: 2 TE; 1 MT       Total Timed Treatment: 45 min  Total Treatment Time: 45 min

## 2024-07-24 NOTE — PROGRESS NOTES
Diagnosis:   Acute right-sided back pain, unspecified back location (M54.9)        Referring Provider: Stephen Modi  Date of Evaluation:    7/17/2024    Precautions:   ASHD Next MD visit:   none scheduled  Date of Surgery: n/a     Insurance Primary/Secondary: MEDICARE / [employer] INDEMNITY     # Auth Visits: 10 per POC            Subjective: Patient states he feels he is \"truly getting better.\" Reports continued stiffness.    Pain: 0/10      Objective:   Rx. See flow chart:  -----------------------------  At IE:   Observation/Posture:   Kyphotic, forward head, rounded shoulders  Sits with a posterior pelvic tilt     Neuro Screen: Light touch intact. Denies BLE N/T     Lumbar AROM: (* denotes performed with pain)  Flexion: 50  Extension: 15  Sidebending: R 15; L 10  Rotation: R 60% wnl; L 60% wnl     Accessory motion:   Thoracic hypomobility PA  Upper Lumbar hypomobility PA  Lower Lumbar - WNL - PA     Palpation:   Denies TTP B: PS, QL, Lat, RTC,      Strength: (* denotes performed with pain)  UE/Scapular LE   Shoulder Flex: R 4+/5, L 5/5  Shoulder ABD (C5): R 5/5, L 5/5  Shoulder ER: R 5/5, L 4+/5  Shoulder IR: R 5/5, L 5/5     Rhomboids: R 4/5, L 4/5  Mid trap: R 4/5; L 4/5  Lats: R 4/5* pain across back, L 4/5  Low trap: R 3/5; L 3/5    Hip flexion (L2): R 5/5; L 5/5  Hip abduction: R 4/5; L 4/5  Hip Extension: R 4/5; L 4/5            Hip ER: R 5/5; L 5/5  Hip IR: R 5/5; L 5/5  Knee Flexion: R 5/5; L 5/5            Knee extension (L3): R 5/5; L 5/5            DF (L4): R 5/5; L 5/5  PF (S1): R 5/5; L 5/5      Flexibility:   LE   Hip Flexor: R Severe restrictions, L Severe restrictions  Hamstrings: R Severe restrictions; L Severe restrictions  Piriformis: R WNL; L WNL  Quads: R Min restrictions; L Min restrictions  Gastroc-soleus: R Min restrictions; L Min restrictions         Gait: pt ambulates on level ground with antalgia, stooped posture/forward lean, trendelenburg/waddle, and slowed charlie .  Balance: SLS R  30 sec, L 30 sec    Assessment: Continued focus on thoracic and lumbar mobility and scapular/TA activation to improve posture, as pain has been diminishing. Cueing required to limit downward gaze and to stand up straight with rows. HEP updated.      Goals:    (to be met in 10 visits)   Pt will improve transversus abdominis recruitment to perform proper isometric contraction without requiring verbal or tactile cuing to promote advancement of therex   Pt will demonstrate good understanding of proper posture and body mechanics to decrease pain and improve spinal safety   Pt will improve lumbar spine AROM flexion to >55 deg to allow increase ease with bending forward to don shoes   Pt will have decreased paraspinal mm tension to tolerate standing >45 minutes for work and home activities   Pt will demonstrate improved core strength to be able to perform sitting with <2/10 pain   Pt will be independent and compliant with comprehensive HEP to maintain progress achieved in PT   Pt will improve hip ABD and ER strength to 4+/5 to increase ease with standing and walking      Plan: Patient will be seen for 1-2 x/week or a total of 10 visits over a 90 day period. Treatment will include: Gait training, Manual Therapy, Neuromuscular Re-education, Therapeutic Activities, Therapeutic Exercise, and Home Exercise Program instruction   Consider adding:  scapular strengthening, Pallof press  Date: 7/19/2024  TX#: 2/10 Date: 7/22/2024                 TX#: 3/10 Date: 7/25/2024           TX#: 4/10 Date:                 TX#: 5/ Date:   Tx#: 6/   TE: 30'   Nu-Step lvl 1 - 5 min  SKTC 5 sec x 10 B  SB roll outs fwd & lat 5 sec x 10 each  HEP review TE: 33'   Nu-Step lvl 2 - 5 min  SB roll outs fwd & lat 5 sec x 10 each  Prone Press up to tolerance x12   Seated thoracic extension, x15   Bridging, 2x10  SB DKTC, 2x10  Standing rows, GTB B 2x10 TE: 20'   SB roll outs fwd & lat 5 sec x 10 each  Prone Press up (unable) - performed prone on elbows  to tolerance x 10   Seated thoracic extension arms behind head - chest open 2 x10, (reports of \"feels good\")  SB DKTC, 2x10 (relieves pain in upper back)     MT: 15'   STM - PS, Lat, QL R   Thoracic CPA grade II  Upper lower CPA grade II MT: 12'  STM - PS, Lat, QL R   Thoracic CPA grade II  Upper lower CPA grade II MT: 15'  STM - PS, Lat, QL R   Thoracic CPA grade II  Upper lumbar CPA grade II     NRE: 10'  PPT 5 sec x 12   TA recruit w/ abd iso w/ belt 3 sec x 10  NRE: 10'  Rows GTB with TA recruitment x 20 (cueing to look up)  TA recruitment with bridges x 15            HEP: Access Code: [email or web address]  URL: [email or web address]  Prepared by: Oleg Chavez     Exercises  - Standing Quadratus Lumborum Stretch with Doorway  - 1 x daily - 7 x weekly - 1 sets - 10 reps - 15 sec hold  - Standing Hip Flexor Stretch  - 1 x daily - 7 x weekly - 5 reps - 20 - 30 sec hold  - Supine Lower Trunk Rotation  - 1 x daily - 7 x weekly - 2 sets - 10 reps - 5 sec hold  - Sidelying Thoracic Rotation with Open Book  - 1 x daily - 7 x weekly - 1-2 sets - 10 reps - 5 sec hold  - Seated Thoracic Extension with Hands Behind Neck  - 1 x daily - 7 x weekly - 2 sets - 10 reps  - Standing Row with Anchored Resistance with TA activation- 1 x daily - 7 x weekly - 2 sets - 10 reps    Charges: 1 TE; 1 MT; 1 NRE       Total Timed Treatment: 45 min  Total Treatment Time: 45 min

## 2024-07-25 ENCOUNTER — OFFICE VISIT (OUTPATIENT)
Dept: PHYSICAL THERAPY | Facility: HOSPITAL | Age: 74
End: 2024-07-25
Attending: INTERNAL MEDICINE
Payer: MEDICARE

## 2024-07-25 PROCEDURE — 97140 MANUAL THERAPY 1/> REGIONS: CPT

## 2024-07-25 PROCEDURE — 97110 THERAPEUTIC EXERCISES: CPT

## 2024-07-25 PROCEDURE — 97112 NEUROMUSCULAR REEDUCATION: CPT

## 2024-08-01 ENCOUNTER — OFFICE VISIT (OUTPATIENT)
Dept: PHYSICAL THERAPY | Facility: HOSPITAL | Age: 74
End: 2024-08-01
Attending: INTERNAL MEDICINE
Payer: MEDICARE

## 2024-08-01 PROCEDURE — 97140 MANUAL THERAPY 1/> REGIONS: CPT

## 2024-08-01 PROCEDURE — 97112 NEUROMUSCULAR REEDUCATION: CPT

## 2024-08-01 PROCEDURE — 97110 THERAPEUTIC EXERCISES: CPT

## 2024-08-01 NOTE — PROGRESS NOTES
Diagnosis:   Acute right-sided back pain, unspecified back location (M54.9)        Referring Provider: Stephen Modi  Date of Evaluation:    7/17/2024    Precautions:   ASHD Next MD visit:   none scheduled  Date of Surgery: n/a     Insurance Primary/Secondary: MEDICARE / [employer] [place] INDEMNITY     # Auth Visits: 10 per POC            Subjective: Patient states his R lower back is feeling \"fabulous.\" However, he reports L sided pain presents itself with bending and twisting. Reports pain is deep, unable to describe how it is feeling or exactly where it is located, but in L lower quadrant, unsure if it is in the back too.   Discussion on paying attention to where exactly pain on L side is present, when is it present, did he just eat, does he have to use the restroom? Pt verbalized understanding.   Pain: 0/10      Objective:   Rx. See flow chart:  -----------------------------  At IE:   Observation/Posture:   Kyphotic, forward head, rounded shoulders  Sits with a posterior pelvic tilt     Neuro Screen: Light touch intact. Denies BLE N/T     Lumbar AROM: (* denotes performed with pain)  Flexion: 50  Extension: 15  Sidebending: R 15; L 10  Rotation: R 60% wnl; L 60% wnl     Accessory motion:   Thoracic hypomobility PA  Upper Lumbar hypomobility PA  Lower Lumbar - WNL - PA     Palpation:   Denies TTP B: PS, QL, Lat, RTC,      Strength: (* denotes performed with pain)  UE/Scapular LE   Shoulder Flex: R 4+/5, L 5/5  Shoulder ABD (C5): R 5/5, L 5/5  Shoulder ER: R 5/5, L 4+/5  Shoulder IR: R 5/5, L 5/5     Rhomboids: R 4/5, L 4/5  Mid trap: R 4/5; L 4/5  Lats: R 4/5* pain across back, L 4/5  Low trap: R 3/5; L 3/5    Hip flexion (L2): R 5/5; L 5/5  Hip abduction: R 4/5; L 4/5  Hip Extension: R 4/5; L 4/5            Hip ER: R 5/5; L 5/5  Hip IR: R 5/5; L 5/5  Knee Flexion: R 5/5; L 5/5            Knee extension (L3): R 5/5; L 5/5            DF (L4): R 5/5; L 5/5  PF (S1): R 5/5; L 5/5      Flexibility:   LE   Hip Flexor: R  Severe restrictions, L Severe restrictions  Hamstrings: R Severe restrictions; L Severe restrictions  Piriformis: R WNL; L WNL  Quads: R Min restrictions; L Min restrictions  Gastroc-soleus: R Min restrictions; L Min restrictions         Gait: pt ambulates on level ground with antalgia, stooped posture/forward lean, trendelenburg/waddle, and slowed charlie .  Balance: SLS R 30 sec, L 30 sec    Assessment: Continued progress in strengthening of TA and scapular region to improve patient posture. Exercises completed slowly and with fatigue. No pain reported throughout therapy.      Goals:    (to be met in 10 visits)   Pt will improve transversus abdominis recruitment to perform proper isometric contraction without requiring verbal or tactile cuing to promote advancement of therex   Pt will demonstrate good understanding of proper posture and body mechanics to decrease pain and improve spinal safety   Pt will improve lumbar spine AROM flexion to >55 deg to allow increase ease with bending forward to don shoes   Pt will have decreased paraspinal mm tension to tolerate standing >45 minutes for work and home activities   Pt will demonstrate improved core strength to be able to perform sitting with <2/10 pain   Pt will be independent and compliant with comprehensive HEP to maintain progress achieved in PT   Pt will improve hip ABD and ER strength to 4+/5 to increase ease with standing and walking      Plan: Patient will be seen for 1-2 x/week or a total of 10 visits over a 90 day period. Treatment will include: Gait training, Manual Therapy, Neuromuscular Re-education, Therapeutic Activities, Therapeutic Exercise, and Home Exercise Program instruction   Consider adding:  scapular strengthening, Pallof press  Date: 7/19/2024  TX#: 2/10 Date: 7/22/2024                 TX#: 3/10 Date: 7/25/2024           TX#: 4/10 Date:8/1/2024                 TX#: 5/10 Date:   Tx#: 6/   TE: 30'   Nu-Step lvl 1 - 5 min  SKTC 5 sec x 10 B  SB  roll outs fwd & lat 5 sec x 10 each  HEP review TE: 33'   Nu-Step lvl 2 - 5 min  SB roll outs fwd & lat 5 sec x 10 each  Prone Press up to tolerance x12   Seated thoracic extension, x15   Bridging, 2x10  SB DKTC, 2x10  Standing rows, GTB B 2x10 TE: 20'   SB roll outs fwd & lat 5 sec x 10 each  Prone Press up (unable) - performed prone on elbows to tolerance x 10   Seated thoracic extension arms behind head - chest open 2 x10, (reports of \"feels good\")  SB DKTC, 2x10 (relieves pain in upper back) TE: 10'   Prone on elbows x 10   DKTC w/ towel assist 5 sec x 10   Seated thoracic extension/lumbar with arms behind head x 15      MT: 15'   STM - PS, Lat, QL R   Thoracic CPA grade II  Upper lower CPA grade II MT: 12'  STM - PS, Lat, QL R   Thoracic CPA grade II  Upper lower CPA grade II MT: 15'  STM - PS, Lat, QL R   Thoracic CPA grade II  Upper lumbar CPA grade II MT: 15'  STM - PS, Lat, QL B   Thoracic CPA grade II  Upper lumbar CPA grade II    NRE: 10'  PPT 5 sec x 12   TA recruit w/ abd iso w/ belt 3 sec x 10  NRE: 10'  Rows GTB with TA recruitment x 20 (cueing to look up)  TA recruitment with bridges x 15 NRE: 20'  Rows GTB with TA recruitment x 20 (cueing to look up)  TA recruitment with bridges x 15  Extensions cueing to look up and keep core engaged YTB x 15  Pallof press YTB x 5 B (cueing to keep head up)           HEP: Access Code: [email or web address]  URL: [email or web address]  Prepared by: Oleg Chavez     Exercises  - Standing Quadratus Lumborum Stretch with Doorway  - 1 x daily - 7 x weekly - 1 sets - 10 reps - 15 sec hold  - Standing Hip Flexor Stretch  - 1 x daily - 7 x weekly - 5 reps - 20 - 30 sec hold  - Supine Lower Trunk Rotation  - 1 x daily - 7 x weekly - 2 sets - 10 reps - 5 sec hold  - Sidelying Thoracic Rotation with Open Book  - 1 x daily - 7 x weekly - 1-2 sets - 10 reps - 5 sec hold  - Seated Thoracic Extension with Hands Behind Neck  - 1 x daily - 7 x weekly - 2 sets - 10 reps  -  Standing Row with Anchored Resistance with TA activation- 1 x daily - 7 x weekly - 2 sets - 10 reps    Charges: 1 TE; 1 MT; 1 NRE       Total Timed Treatment: 45 min  Total Treatment Time: 45 min

## 2024-08-08 ENCOUNTER — OFFICE VISIT (OUTPATIENT)
Dept: PHYSICAL THERAPY | Facility: HOSPITAL | Age: 74
End: 2024-08-08
Attending: INTERNAL MEDICINE
Payer: MEDICARE

## 2024-08-08 PROCEDURE — 97140 MANUAL THERAPY 1/> REGIONS: CPT

## 2024-08-08 PROCEDURE — 97110 THERAPEUTIC EXERCISES: CPT

## 2024-08-08 PROCEDURE — 97112 NEUROMUSCULAR REEDUCATION: CPT

## 2024-08-08 NOTE — PROGRESS NOTES
Diagnosis:   Acute right-sided back pain, unspecified back location (M54.9)        Referring Provider: Stephen Modi  Date of Evaluation:    7/17/2024    Precautions:   ASHD Next MD visit:   none scheduled  Date of Surgery: n/a     Insurance Primary/Secondary: MEDICARE / [employer] INDEMNITY     # Auth Visits: 10 per POC            Subjective: Patient states continued L sided abdominal pain. He reports he has not noticed a change in pain with when he eats or if he needs to use the restroom. Reports Naproxen does mask the pain. Instructed pt to speak with his doctor about L sided abdominal pain. Patient reports his back feels good.    Pain: 0/10      Objective:   Rx. See flow chart:  8/8/2024:  Lumbar AROM: (* denotes performed with pain)  Flexion: 50  Extension: 10  Sidebending: R 15; L 10  Rotation: R 80% wnl; L 80% wnl     -----------------------------  At IE:   Observation/Posture:   Kyphotic, forward head, rounded shoulders  Sits with a posterior pelvic tilt     Neuro Screen: Light touch intact. Denies BLE N/T     Lumbar AROM: (* denotes performed with pain)  Flexion: 50  Extension: 15  Sidebending: R 15; L 10  Rotation: R 60% wnl; L 60% wnl     Accessory motion:   Thoracic hypomobility PA  Upper Lumbar hypomobility PA  Lower Lumbar - WNL - PA     Palpation:   Denies TTP B: PS, QL, Lat, RTC,      Strength: (* denotes performed with pain)  UE/Scapular LE   Shoulder Flex: R 4+/5, L 5/5  Shoulder ABD (C5): R 5/5, L 5/5  Shoulder ER: R 5/5, L 4+/5  Shoulder IR: R 5/5, L 5/5     Rhomboids: R 4/5, L 4/5  Mid trap: R 4/5; L 4/5  Lats: R 4/5* pain across back, L 4/5  Low trap: R 3/5; L 3/5    Hip flexion (L2): R 5/5; L 5/5  Hip abduction: R 4/5; L 4/5  Hip Extension: R 4/5; L 4/5            Hip ER: R 5/5; L 5/5  Hip IR: R 5/5; L 5/5  Knee Flexion: R 5/5; L 5/5            Knee extension (L3): R 5/5; L 5/5            DF (L4): R 5/5; L 5/5  PF (S1): R 5/5; L 5/5      Flexibility:   LE   Hip Flexor: R Severe restrictions, L  Severe restrictions  Hamstrings: R Severe restrictions; L Severe restrictions  Piriformis: R WNL; L WNL  Quads: R Min restrictions; L Min restrictions  Gastroc-soleus: R Min restrictions; L Min restrictions         Gait: pt ambulates on level ground with antalgia, stooped posture/forward lean, trendelenburg/waddle, and slowed charlie .  Balance: SLS R 30 sec, L 30 sec    Assessment: Continued focus on stabilization. All exercises performed with good form and no pain. Patient is progressing well.       Goals:    (to be met in 10 visits)   Pt will improve transversus abdominis recruitment to perform proper isometric contraction without requiring verbal or tactile cuing to promote advancement of therex   Pt will demonstrate good understanding of proper posture and body mechanics to decrease pain and improve spinal safety   Pt will improve lumbar spine AROM flexion to >55 deg to allow increase ease with bending forward to don shoes   Pt will have decreased paraspinal mm tension to tolerate standing >45 minutes for work and home activities   Pt will demonstrate improved core strength to be able to perform sitting with <2/10 pain   Pt will be independent and compliant with comprehensive HEP to maintain progress achieved in PT   Pt will improve hip ABD and ER strength to 4+/5 to increase ease with standing and walking      Plan: Patient will be seen for 1-2 x/week or a total of 10 visits over a 90 day period. Treatment will include: Gait training, Manual Therapy, Neuromuscular Re-education, Therapeutic Activities, Therapeutic Exercise, and Home Exercise Program instruction   Consider adding:  scapular strengthening,   Date: 7/19/2024  TX#: 2/10 Date: 7/22/2024                 TX#: 3/10 Date: 7/25/2024           TX#: 4/10 Date:8/1/2024                 TX#: 5/10 Date: 8/8/2024  Tx#: 6/10   TE: 30'   Nu-Step lvl 1 - 5 min  SKTC 5 sec x 10 B  SB roll outs fwd & lat 5 sec x 10 each  HEP review TE: 33'   Nu-Step lvl 2 - 5  min  SB roll outs fwd & lat 5 sec x 10 each  Prone Press up to tolerance x12   Seated thoracic extension, x15   Bridging, 2x10  SB DKTC, 2x10  Standing rows, GTB B 2x10 TE: 20'   SB roll outs fwd & lat 5 sec x 10 each  Prone Press up (unable) - performed prone on elbows to tolerance x 10   Seated thoracic extension arms behind head - chest open 2 x10, (reports of \"feels good\")  SB DKTC, 2x10 (relieves pain in upper back) TE: 10'   Prone on elbows x 10   DKTC w/ towel assist 5 sec x 10   Seated thoracic extension/lumbar with arms behind head x 15   TE: 10'   Bridge with abd iso x 15   Clamshells x 15 B   MT: 15'   STM - PS, Lat, QL R   Thoracic CPA grade II  Upper lower CPA grade II MT: 12'  STM - PS, Lat, QL R   Thoracic CPA grade II  Upper lower CPA grade II MT: 15'  STM - PS, Lat, QL R   Thoracic CPA grade II  Upper lumbar CPA grade II MT: 15'  STM - PS, Lat, QL B   Thoracic CPA grade II  Upper lumbar CPA grade II MT: 15'  STM - PS, Lat, QL B   Thoracic CPA grade II  Upper lumbar CPA grade II   NRE: 10'  PPT 5 sec x 12   TA recruit w/ abd iso w/ belt 3 sec x 10  NRE: 10'  Rows GTB with TA recruitment x 20 (cueing to look up)  TA recruitment with bridges x 15 NRE: 20'  Rows GTB with TA recruitment x 20 (cueing to look up)  TA recruitment with bridges x 15  Extensions cueing to look up and keep core engaged YTB x 15  Pallof press YTB x 5 B (cueing to keep head up) NRE: 15'  TA recruit with supine march x 10   Extensions RTB x 20 (cueing needed to keep head up & limit downward gaze)  Pallof press YTB x 5 B            HEP: Access Code: [email or web address]  URL: [email or web address]  Prepared by: Oleg Chavez     Exercises  - Standing Quadratus Lumborum Stretch with Doorway  - 1 x daily - 7 x weekly - 1 sets - 10 reps - 15 sec hold  - Standing Hip Flexor Stretch  - 1 x daily - 7 x weekly - 5 reps - 20 - 30 sec hold  - Supine Lower Trunk Rotation  - 1 x daily - 7 x weekly - 2 sets - 10 reps - 5 sec hold  -  Sidelying Thoracic Rotation with Open Book  - 1 x daily - 7 x weekly - 1-2 sets - 10 reps - 5 sec hold  - Seated Thoracic Extension with Hands Behind Neck  - 1 x daily - 7 x weekly - 2 sets - 10 reps  - Standing Row with Anchored Resistance with TA activation- 1 x daily - 7 x weekly - 2 sets - 10 reps    Charges: 1 TE; 1 MT; 1 NRE       Total Timed Treatment: 40 min  Total Treatment Time: 40 min

## 2024-08-15 ENCOUNTER — OFFICE VISIT (OUTPATIENT)
Dept: PHYSICAL THERAPY | Facility: HOSPITAL | Age: 74
End: 2024-08-15
Attending: INTERNAL MEDICINE
Payer: MEDICARE

## 2024-08-15 NOTE — PROGRESS NOTES
Pt presented to therapy, but therapy not completed today. Reports L abdominal pain. He reports no back pain or referred pain anywhere else. Pain is localized to L abdominal area. He reports no change in pain with eating or using the restroom. Denies bowel changes. Reports he has an appointment scheduled with PCP next week to discuss this matter. Advised if pain worsens to seek medical attention, pt verbalized understanding.

## 2024-08-19 ENCOUNTER — OFFICE VISIT (OUTPATIENT)
Dept: INTERNAL MEDICINE CLINIC | Facility: CLINIC | Age: 74
End: 2024-08-19

## 2024-08-19 VITALS
SYSTOLIC BLOOD PRESSURE: 138 MMHG | HEART RATE: 73 BPM | WEIGHT: 162.63 LBS | BODY MASS INDEX: 24.09 KG/M2 | DIASTOLIC BLOOD PRESSURE: 88 MMHG | HEIGHT: 69 IN

## 2024-08-19 DIAGNOSIS — M54.50 ACUTE LEFT-SIDED LOW BACK PAIN WITHOUT SCIATICA: Primary | ICD-10-CM

## 2024-08-19 PROCEDURE — 99213 OFFICE O/P EST LOW 20 MIN: CPT | Performed by: INTERNAL MEDICINE

## 2024-08-19 PROCEDURE — G2211 COMPLEX E/M VISIT ADD ON: HCPCS | Performed by: INTERNAL MEDICINE

## 2024-08-19 NOTE — PROGRESS NOTES
Emeterio Hawkins Jr. is a 74 year old male.   Chief Complaint   Patient presents with    Back Pain     HPI:   Mr. Hawkins presents this afternoon, accompanied by his wife, for evaluation.    In July, he began physical therapy to treat right-sided low back pain which prompted a visit in June.  Right sided back pain is now significantly better, but now he has developed left-sided low back pain laterally that seems to occur when he does certain exercises in physical therapy, and also when he moves certain ways such as bending and twisting.  He has been using ibuprofen OTC and occasionally a muscle relaxant with some improvement.  He also has been applying heat with relief.  Pain is not related to meals, urination or defecation.  No associated fever.  Appetite normal.  No heartburn dysphagia nausea vomiting or abdominal pain.  No diarrhea constipation or rectal bleeding.  No urinary frequency urgency dysuria or hematuria.    Medications reviewed, as listed below.  Current Outpatient Medications   Medication Sig Dispense Refill    rosuvastatin 5 MG Oral Tab Take 1 tablet (5 mg total) by mouth nightly.      saccharomyces boulardii 250 MG Oral Cap Take 1 capsule (250 mg total) by mouth daily.      Multiple Vitamins-Minerals (MULTIVITAMIN OR) Take 1 tablet by mouth daily.      naproxen 500 MG Oral Tab Take 1 tablet (500 mg total) by mouth 2 (two) times daily with meals. (Patient not taking: Reported on 8/19/2024) 30 tablet 0     Allergies   Allergen Reactions    Gold Salts RASH      Past Medical History:    ASHD (arteriosclerotic heart disease)    Subclinical; cardiac CT 2-24 with calcium score 630 (LAD greater than RCA greater than LCx)    Erectile dysfunction    Thrombocytopenia (HCC)     Past Surgical History:   Procedure Laterality Date    Colonoscopy N/A 4/27/2022    Procedure: COLONOSCOPY;  Surgeon: Guero Corey MD;  Location: [facility] ENDOSCOPY    Foot surgery Right 2003    Vasectomy  1983      Social  History:  Social History     Socioeconomic History    Marital status:    Tobacco Use    Smoking status: Never    Smokeless tobacco: Never   Vaping Use    Vaping status: Never Used   Substance and Sexual Activity    Alcohol use: No    Drug use: No    Sexual activity: Not Currently     Partners: Female   Other Topics Concern    Caffeine Concern Yes     Comment: coffee     Social Determinants of Health      [facility]    Social Connections    [facility]    Housing Stability        EXAM:   GENERAL: Pleasant male appearing well in no distress  /88 (BP Location: Right arm, Patient Position: Sitting, Cuff Size: adult)   Pulse 73   Ht 5' 9\" (1.753 m)   Wt 162 lb 9.6 oz (73.8 kg)   BMI 24.01 kg/m²   HEENT: Anicteric, conjunctiva pink, oropharynx normal  NECK: Supple without mass or lymphadenopathy  LUNGS: Resonant to percussion and clear to auscultation  BACK: No CVA tenderness.  Left lateral low back pain worse with bending turning and twisting  CARDIAC: Rhythm regular S1 S2 normal without murmur   ABDOMEN: Bowel sounds normal soft nontender without mass or hepatosplenomegaly      ASSESSMENT AND PLAN:   1. Acute left-sided low back pain without sciatica  Muscular strain  Recommend rest, gentle stretching exercises, heat application  Ibuprofen or naproxen, and cyclobenzaprine, as needed.  He has at home  Continue physical therapy      The patient indicates understanding of these issues and agrees to the plan.  The patient is asked to return as needed.    Stephen Modi MD  8/19/2024  4:42 PM

## 2024-08-19 NOTE — PATIENT INSTRUCTIONS
Please avoid painful activity but perform gentle stretching exercises regularly  Apply heat to the affected area 2-3 times daily  Take ibuprofen or naproxen as needed.  You also may use cyclobenzaprine  Continue physical therapy

## 2024-08-20 ENCOUNTER — TELEPHONE (OUTPATIENT)
Dept: PHYSICAL THERAPY | Facility: HOSPITAL | Age: 74
End: 2024-08-20

## 2024-08-23 ENCOUNTER — APPOINTMENT (OUTPATIENT)
Dept: PHYSICAL THERAPY | Facility: HOSPITAL | Age: 74
End: 2024-08-23
Attending: INTERNAL MEDICINE
Payer: MEDICARE

## 2024-08-27 ENCOUNTER — HOSPITAL ENCOUNTER (OUTPATIENT)
Age: 74
Discharge: HOME | End: 2024-08-27
Payer: MEDICARE

## 2024-08-27 VITALS
SYSTOLIC BLOOD PRESSURE: 150 MMHG | DIASTOLIC BLOOD PRESSURE: 72 MMHG | HEART RATE: 81 BPM | RESPIRATION RATE: 18 BRPM | OXYGEN SATURATION: 99 % | TEMPERATURE: 99 F
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
## 2024-08-27 PROCEDURE — 99213 OFFICE O/P EST LOW 20 MIN: CPT | Performed by: NURSE PRACTITIONER

## 2024-08-27 RX ORDER — TRIAMCINOLONE ACETONIDE 1 MG/G
CREAM TOPICAL 2 TIMES DAILY
Qty: 45 G | Refills: 0 | Status: SHIPPED | OUTPATIENT
Start: 2024-08-27 | End: 2024-09-10

## 2024-08-27 NOTE — ED PROVIDER NOTES
Patient Seen in: [facility]      History     Chief Complaint   Patient presents with    Skin Problem     Stated Complaint: SKIN PROBLEM    Subjective:   HPI    74-year-old male presents with scattered insect bites.  He states he was outside over the weekend and received scattered bites on his arms and legs.    Objective:   Past Medical History:    ASHD (arteriosclerotic heart disease)    Subclinical; cardiac CT 2-24 with calcium score 630 (LAD greater than RCA greater than LCx)    Erectile dysfunction    Thrombocytopenia (HCC)              Past Surgical History:   Procedure Laterality Date    Colonoscopy N/A 4/27/2022    Procedure: COLONOSCOPY;  Surgeon: Guero Corey MD;  Location: [facility] ENDOSCOPY    Foot surgery Right 2003    Vasectomy  1983                Social History     Socioeconomic History    Marital status:    Tobacco Use    Smoking status: Never    Smokeless tobacco: Never   Vaping Use    Vaping status: Never Used   Substance and Sexual Activity    Alcohol use: No    Drug use: No    Sexual activity: Not Currently     Partners: Female   Other Topics Concern    Caffeine Concern Yes     Comment: coffee     Social Determinants of Health      [facility], [facility]    Social Connections    Received from [facility], [facility]    Housing Stability              Review of Systems    Positive for stated Chief Complaint: Skin Problem    Other systems are as noted in HPI.  Constitutional and vital signs reviewed.      All other systems reviewed and negative except as noted above.    Physical Exam     ED Triage Vitals [08/27/24 1350]   /72   Pulse 81   Resp 18   Temp 98.6 °F (37 °C)   Temp src Temporal   SpO2 99 %   O2 Device None (Room air)       Current Vitals:   Vital Signs  BP: 150/72  Pulse: 81  Resp: 18  Temp: 98.6 °F (37 °C)  Temp src: Temporal    Oxygen Therapy  SpO2: 99 %  O2  Device: None (Room air)            Physical Exam  Vitals reviewed.   Constitutional:       General: He is not in acute distress.  HENT:      Nose: Nose normal.      Mouth/Throat:      Mouth: Mucous membranes are moist.   Cardiovascular:      Rate and Rhythm: Normal rate and regular rhythm.   Pulmonary:      Effort: Pulmonary effort is normal.      Breath sounds: Normal breath sounds.   Skin:     General: Skin is warm and dry.      Findings: Erythema and lesion present.      Comments: + red round insect bites scattered on bilateral arms and legs.     Neurological:      General: No focal deficit present.      Mental Status: He is alert and oriented to person, place, and time.   Psychiatric:         Mood and Affect: Mood normal.         Behavior: Behavior normal.               ED Course   Labs Reviewed - No data to display                   MDM                                         Medical Decision Making  74-year-old male presents with concern for scattered bug bites.  Differential diagnosis includes insect bite local reaction, cellulitis, contact dermatitis, abscess.  By exam patient has scattered red round raised lesions here describes it as being very itchy.  These are consistent with insect bites.  There is no radiating erythema patient is afebrile in no acute distress.  Prescription for triamcinolone was sent to patient's pharmacy.  He was given printed instructions for help with other symptom relief.    Risk  OTC drugs.  Prescription drug management.        Disposition and Plan     Clinical Impression:  1. Insect bite, unspecified site, initial encounter         Disposition:  Discharge  8/27/2024  2:00 pm    Follow-up:  Stephen Modi MD  [address] [place] [id number]  [phone]      If symptoms worsen          Medications Prescribed:  Discharge Medication List as of 8/27/2024  2:00 PM        START taking these medications    Details   triamcinolone 0.1 % External Cream Apply topically 2 (two) times  daily for 14 days., Normal, Disp-45 g, R-0

## 2024-08-27 NOTE — ED INITIAL ASSESSMENT (HPI)
Pt c/o possible bug bites to B arms, B legs from block party while sitting under a tree 3 days ago.  No drainage.  No fever.  Positive itching.

## 2024-08-29 ENCOUNTER — APPOINTMENT (OUTPATIENT)
Dept: PHYSICAL THERAPY | Facility: HOSPITAL | Age: 74
End: 2024-08-29
Attending: INTERNAL MEDICINE
Payer: MEDICARE

## 2024-09-03 ENCOUNTER — APPOINTMENT (OUTPATIENT)
Dept: PHYSICAL THERAPY | Facility: HOSPITAL | Age: 74
End: 2024-09-03
Attending: INTERNAL MEDICINE
Payer: MEDICARE

## 2024-09-25 ENCOUNTER — OFFICE VISIT (OUTPATIENT)
Dept: PHYSICAL THERAPY | Facility: HOSPITAL | Age: 74
End: 2024-09-25
Attending: INTERNAL MEDICINE
Payer: MEDICARE

## 2024-09-25 PROCEDURE — 97110 THERAPEUTIC EXERCISES: CPT

## 2024-09-25 PROCEDURE — 97140 MANUAL THERAPY 1/> REGIONS: CPT

## 2024-09-25 NOTE — PROGRESS NOTES
Progress Summary  Pt has attended 7 visits in Physical Therapy.    Diagnosis:   Acute right-sided back pain, unspecified back location (M54.9)        Referring Provider: Stephen Modi  Date of Evaluation:    7/17/2024    Precautions:   ASHD Next MD visit:   none scheduled  Date of Surgery: n/a     Insurance Primary/Secondary: [place] / [place]     # Auth Visits: 10 per POC            Subjective: Pt reports L sided back pain has resolved. He reports it took weeks, but has resolved. He reports picking up a door which was about 50 lb. He reports with this no L sided back pain, but R sided back pain. He reports continued R sided back pain since lifting heavy. Use of NSAIDs and muscle relaxer to minimize pain. He reports fear of pain escalating. He requests no use of PPT or abdominal exercises or twisting today.     Pain:4-5 /10 R lumbar      Objective:   Rx. See flow chart:  9/25/2024:  LE   Hip flexion (L2): R 4/5; L 4/5  Hip abduction: R 3-/5; L 3-/5  Hip Extension: R 4/*5; L 4/5            Hip ER: R 4+/5; L 5/5  Hip IR: R 4+/5; L 5/5  Knee Flexion: R 5/5; L 5/5            Knee extension (L3): R 4/5; L 5/5            DF (L4): R 5/5; L 5/5  PF (S1): R 5/5; L 5/5     Lumbar AROM: (* denotes performed with pain)  Flexion: 45  Extension: 10  Sidebending: R 5 L 8  Rotation: R 80% wnl; L 80% wnl    Severe hip flexor restrictions bilaterally    Observation/Posture:   Kyphotic, forward head, rounded shoulders, hip hinge    Accessory Motion:   Thoracic hypomobility  -------------------------------------  8/8/2024:  Lumbar AROM: (* denotes performed with pain)  Flexion: 50  Extension: 10  Sidebending: R 15; L 10  Rotation: R 80% wnl; L 80% wnl     -----------------------------  At IE:   Observation/Posture:   Kyphotic, forward head, rounded shoulders  Sits with a posterior pelvic tilt     Neuro Screen: Light touch intact. Denies BLE N/T     Lumbar AROM: (* denotes performed with pain)  Flexion: 50  Extension:  15  Sidebending: R 15; L 10  Rotation: R 60% wnl; L 60% wnl     Accessory motion:   Thoracic hypomobility PA  Upper Lumbar hypomobility PA  Lower Lumbar - WNL - PA     Palpation:   Denies TTP B: PS, QL, Lat, RTC,      Strength: (* denotes performed with pain)  UE/Scapular LE   Shoulder Flex: R 4+/5, L 5/5  Shoulder ABD (C5): R 5/5, L 5/5  Shoulder ER: R 5/5, L 4+/5  Shoulder IR: R 5/5, L 5/5     Rhomboids: R 4/5, L 4/5  Mid trap: R 4/5; L 4/5  Lats: R 4/5* pain across back, L 4/5  Low trap: R 3/5; L 3/5    Hip flexion (L2): R 5/5; L 5/5  Hip abduction: R 4/5; L 4/5  Hip Extension: R 4/5; L 4/5            Hip ER: R 5/5; L 5/5  Hip IR: R 5/5; L 5/5  Knee Flexion: R 5/5; L 5/5            Knee extension (L3): R 5/5; L 5/5            DF (L4): R 5/5; L 5/5  PF (S1): R 5/5; L 5/5      Flexibility:   LE   Hip Flexor: R Severe restrictions, L Severe restrictions  Hamstrings: R Severe restrictions; L Severe restrictions  Piriformis: R WNL; L WNL  Quads: R Min restrictions; L Min restrictions  Gastroc-soleus: R Min restrictions; L Min restrictions         Gait: pt ambulates on level ground with antalgia, stooped posture/forward lean, trendelenburg/waddle, and slowed charlie .  Balance: SLS R 30 sec, L 30 sec    Assessment: Due to pt not being seen for several weeks, PN completed. Pt demonstrates slowness and caution with all movements. He demonstrates a decrease in lumbar AROM and a decrease in strength since last seen. Pt would benefit from skilled PT services to address deficits noted.        Goals:    (to be met in 12 visits)   Pt will improve transversus abdominis recruitment to perform proper isometric contraction without requiring verbal or tactile cuing to promote advancement of therex   Pt will demonstrate good understanding of proper posture and body mechanics to decrease pain and improve spinal safety   Pt will improve lumbar spine AROM flexion to >55 deg to allow increase ease with bending forward to don shoes   Pt  will have decreased paraspinal mm tension to tolerate standing >45 minutes for work and home activities   Pt will demonstrate improved core strength to be able to perform sitting with <2/10 pain   Pt will be independent and compliant with comprehensive HEP to maintain progress achieved in PT   Pt will improve hip ABD and ER strength to 4+/5 to increase ease with standing and walking      Plan: Patient will be seen for 1-2 x/week or a total of 12 visits over a 90 day period. Treatment will include: Gait training, Manual Therapy, Neuromuscular Re-education, Therapeutic Activities, Therapeutic Exercise, and Home Exercise Program instruction     Patient/Family/Caregiver was advised of these findings, precautions, and treatment options and has agreed to actively participate in planning and for this course of care.    Thank you for your referral. If you have any questions, please contact me at Dept: [phone].    Sincerely,  Electronically signed by therapist: Oleg Chavez, PT     Physician's certification required:  Yes  Please co-sign or sign and return this letter via fax as soon as possible to [phone].   I certify the need for these services furnished under this plan of treatment and while under my care.    X___________________________________________________ Date____________________    Certification From: 9/25/2024  To:12/24/2024     Date: 7/19/2024  TX#: 2/10 Date:7/22/2024                 TX#: 3/10 Date: 7/25/2024           TX#: 4/10 Date:8/1/2024                 TX#: 5/10 Date: 8/8/2024  Tx#: 6/10 Date: 9/25/2024  Tx#: 7/12  PN   TE: 30'   Nu-Step lvl 1 - 5 min  SKTC 5 sec x 10 B  SB roll outs fwd & lat 5 sec x 10 each  HEP review TE: 33'   Nu-Step lvl 2 - 5 min  SB roll outs fwd & lat 5 sec x 10 each  Prone Press up to tolerance x12   Seated thoracic extension, x15   Bridging, 2x10  SB DKTC, 2x10  Standing rows, GTB B 2x10 TE: 20'   SB roll outs fwd & lat 5 sec x 10 each  Prone Press up (unable) -  performed prone on elbows to tolerance x 10   Seated thoracic extension arms behind head - chest open 2 x10, (reports of \"feels good\")  SB DKTC, 2x10 (relieves pain in upper back) TE: 10'   Prone on elbows x 10   DKTC w/ towel assist 5 sec x 10   Seated thoracic extension/lumbar with arms behind head x 15   TE: 10'   Bridge with abd iso x 15   Clamshells x 15 B TE: 30'  Reassessment  Hip flexor stretch w/ PT over pressure 30 sec x 2 B  Clamshell x15 B   MT: 15'   STM - PS, Lat, QL R   Thoracic CPA grade II  Upper lower CPA grade II MT: 12'  STM - PS, Lat, QL R   Thoracic CPA grade II  Upper lower CPA grade II MT: 15'  STM - PS, Lat, QL R   Thoracic CPA grade II  Upper lumbar CPA grade II MT: 15'  STM - PS, Lat, QL B   Thoracic CPA grade II  Upper lumbar CPA grade II MT: 15'  STM - PS, Lat, QL B   Thoracic CPA grade II  Upper lumbar CPA grade II MT: 10'   R PS - STM  Thoracic CPA grade II     NRE: 10'  PPT 5 sec x 12   TA recruit w/ abd iso w/ belt 3 sec x 10  NRE: 10'  Rows GTB with TA recruitment x 20 (cueing to look up)  TA recruitment with bridges x 15 NRE: 20'  Rows GTB with TA recruitment x 20 (cueing to look up)  TA recruitment with bridges x 15  Extensions cueing to look up and keep core engaged YTB x 15  Pallof press YTB x 5 B (cueing to keep head up) NRE: 15'  TA recruit with supine march x 10   Extensions RTB x 20 (cueing needed to keep head up & limit downward gaze)  Pallof press YTB x 5 B              HEP: Access Code: [email or web address]  URL: [email or web address]  Prepared by: Oleg Chavez     Exercises  - Standing Quadratus Lumborum Stretch with Doorway  - 1 x daily - 7 x weekly - 1 sets - 10 reps - 15 sec hold  - Standing Hip Flexor Stretch  - 1 x daily - 7 x weekly - 5 reps - 20 - 30 sec hold  - Supine Lower Trunk Rotation  - 1 x daily - 7 x weekly - 2 sets - 10 reps - 5 sec hold  - Sidelying Thoracic Rotation with Open Book  - 1 x daily - 7 x weekly - 1-2 sets - 10 reps - 5 sec hold  -  Seated Thoracic Extension with Hands Behind Neck  - 1 x daily - 7 x weekly - 2 sets - 10 reps  - Standing Row with Anchored Resistance with TA activation- 1 x daily - 7 x weekly - 2 sets - 10 reps  ------------------------------------------------------------------------  - Clamshell  - 1 x daily - 7 x weekly - 2 sets - 10 reps  - Hip Flexor Stretch at Edge of Bed  - 1 x daily - 7 x weekly - 3-5 reps - 20-30sec hold    Charges: 2 TE; 1 MT   Total Timed Treatment: 40 min  Total Treatment Time: 40 min

## 2024-10-02 ENCOUNTER — OFFICE VISIT (OUTPATIENT)
Dept: PHYSICAL THERAPY | Facility: HOSPITAL | Age: 74
End: 2024-10-02
Attending: INTERNAL MEDICINE
Payer: MEDICARE

## 2024-10-02 PROCEDURE — 97112 NEUROMUSCULAR REEDUCATION: CPT

## 2024-10-02 PROCEDURE — 97140 MANUAL THERAPY 1/> REGIONS: CPT

## 2024-10-02 PROCEDURE — 97110 THERAPEUTIC EXERCISES: CPT

## 2024-10-02 NOTE — PROGRESS NOTES
Diagnosis:   Acute right-sided back pain, unspecified back location (M54.9)        Referring Provider: Stephen Modi  Date of Evaluation:    7/17/2024    Precautions:   ASHD Next MD visit:   none scheduled  Date of Surgery: n/a     Insurance Primary/Secondary: [place] / [place]     # Auth Visits: 10 per POC            Subjective: Pt reports he is no longer taking muscle relaxer or pain medication. He reports no pain currently.    Pain: 0/10 R lumbar       Objective:   Rx. See flow chart:  9/25/2024:  LE   Hip flexion (L2): R 4/5; L 4/5  Hip abduction: R 3-/5; L 3-/5  Hip Extension: R 4/*5; L 4/5            Hip ER: R 4+/5; L 5/5  Hip IR: R 4+/5; L 5/5  Knee Flexion: R 5/5; L 5/5            Knee extension (L3): R 4/5; L 5/5            DF (L4): R 5/5; L 5/5  PF (S1): R 5/5; L 5/5     Lumbar AROM: (* denotes performed with pain)  Flexion: 45  Extension: 10  Sidebending: R 5 L 8  Rotation: R 80% wnl; L 80% wnl    Severe hip flexor restrictions bilaterally    Observation/Posture:   Kyphotic, forward head, rounded shoulders, hip hinge    Accessory Motion:   Thoracic hypomobility  -------------------------------------  8/8/2024:  Lumbar AROM: (* denotes performed with pain)  Flexion: 50  Extension: 10  Sidebending: R 15; L 10  Rotation: R 80% wnl; L 80% wnl     -----------------------------  At IE:   Observation/Posture:   Kyphotic, forward head, rounded shoulders  Sits with a posterior pelvic tilt     Neuro Screen: Light touch intact. Denies BLE N/T     Lumbar AROM: (* denotes performed with pain)  Flexion: 50  Extension: 15  Sidebending: R 15; L 10  Rotation: R 60% wnl; L 60% wnl     Accessory motion:   Thoracic hypomobility PA  Upper Lumbar hypomobility PA  Lower Lumbar - WNL - PA     Palpation:   Denies TTP B: PS, QL, Lat, RTC,      Strength: (* denotes performed with pain)  UE/Scapular LE   Shoulder Flex: R 4+/5, L 5/5  Shoulder ABD (C5): R 5/5, L 5/5  Shoulder ER: R 5/5, L 4+/5  Shoulder IR: R 5/5, L  5/5     Rhomboids: R 4/5, L 4/5  Mid trap: R 4/5; L 4/5  Lats: R 4/5* pain across back, L 4/5  Low trap: R 3/5; L 3/5    Hip flexion (L2): R 5/5; L 5/5  Hip abduction: R 4/5; L 4/5  Hip Extension: R 4/5; L 4/5            Hip ER: R 5/5; L 5/5  Hip IR: R 5/5; L 5/5  Knee Flexion: R 5/5; L 5/5            Knee extension (L3): R 5/5; L 5/5            DF (L4): R 5/5; L 5/5  PF (S1): R 5/5; L 5/5      Flexibility:   LE   Hip Flexor: R Severe restrictions, L Severe restrictions  Hamstrings: R Severe restrictions; L Severe restrictions  Piriformis: R WNL; L WNL  Quads: R Min restrictions; L Min restrictions  Gastroc-soleus: R Min restrictions; L Min restrictions         Gait: pt ambulates on level ground with antalgia, stooped posture/forward lean, trendelenburg/waddle, and slowed charlie .  Balance: SLS R 30 sec, L 30 sec    Assessment: Progressing strength to promote an upright posture with walking and standing. No excerebration of sx. with exercises. Continue to progress strength as able.         Goals:    (to be met in 12 visits)   Pt will improve transversus abdominis recruitment to perform proper isometric contraction without requiring verbal or tactile cuing to promote advancement of therex   Pt will demonstrate good understanding of proper posture and body mechanics to decrease pain and improve spinal safety   Pt will improve lumbar spine AROM flexion to >55 deg to allow increase ease with bending forward to don shoes   Pt will have decreased paraspinal mm tension to tolerate standing >45 minutes for work and home activities   Pt will demonstrate improved core strength to be able to perform sitting with <2/10 pain   Pt will be independent and compliant with comprehensive HEP to maintain progress achieved in PT   Pt will improve hip ABD and ER strength to 4+/5 to increase ease with standing and walking      Plan: Patient will be seen for 1-2 x/week or a total of 12 visits over a 90 day period. Treatment will include:  Gait training, Manual Therapy, Neuromuscular Re-education, Therapeutic Activities, Therapeutic Exercise, and Home Exercise Program instruction     Certification From: 9/25/2024  To:12/24/2024     Date: 7/19/2024  TX#: 2/10 Date:7/22/2024                 TX#: 3/10 Date: 7/25/2024           TX#: 4/10 Date:8/1/2024                 TX#: 5/10 Date: 8/8/2024  Tx#: 6/10 Date: 9/25/2024  Tx#: 7/12  PN Date: 10/2/2024  Tx#: 8/12   TE: 30'   Nu-Step lvl 1 - 5 min  SKTC 5 sec x 10 B  SB roll outs fwd & lat 5 sec x 10 each  HEP review TE: 33'   Nu-Step lvl 2 - 5 min  SB roll outs fwd & lat 5 sec x 10 each  Prone Press up to tolerance x12   Seated thoracic extension, x15   Bridging, 2x10  SB DKTC, 2x10  Standing rows, GTB B 2x10 TE: 20'   SB roll outs fwd & lat 5 sec x 10 each  Prone Press up (unable) - performed prone on elbows to tolerance x 10   Seated thoracic extension arms behind head - chest open 2 x10, (reports of \"feels good\")  SB DKTC, 2x10 (relieves pain in upper back) TE: 10'   Prone on elbows x 10   DKTC w/ towel assist 5 sec x 10   Seated thoracic extension/lumbar with arms behind head x 15   TE: 10'   Bridge with abd iso x 15   Clamshells x 15 B TE: 30'  Reassessment  Hip flexor stretch w/ PT over pressure 30 sec x 2 B  Clamshell x15 B TE: 12'  Hip flexor stretch w/ PT over pressure 30 sec x 3 B  SBDKTC x 10   Slider hip abd x 10 B     MT: 15'   STM - PS, Lat, QL R   Thoracic CPA grade II  Upper lower CPA grade II MT: 12'  STM - PS, Lat, QL R   Thoracic CPA grade II  Upper lower CPA grade II MT: 15'  STM - PS, Lat, QL R   Thoracic CPA grade II  Upper lumbar CPA grade II MT: 15'  STM - PS, Lat, QL B   Thoracic CPA grade II  Upper lumbar CPA grade II MT: 15'  STM - PS, Lat, QL B   Thoracic CPA grade II  Upper lumbar CPA grade II MT: 10'   R PS - STM  Thoracic CPA grade II   MT: 15'   R PS - STM  Thoracic CPA grade II   NRE: 10'  PPT 5 sec x 12   TA recruit w/ abd iso w/ belt 3 sec x 10  NRE: 10'  Rows GTB with TA  recruitment x 20 (cueing to look up)  TA recruitment with bridges x 15 NRE: 20'  Rows GTB with TA recruitment x 20 (cueing to look up)  TA recruitment with bridges x 15  Extensions cueing to look up and keep core engaged YTB x 15  Pallof press YTB x 5 B (cueing to keep head up) NRE: 15'  TA recruit with supine march x 10   Extensions RTB x 20 (cueing needed to keep head up & limit downward gaze)  Pallof press YTB x 5 B    NRE: 15'  Prone shoulder extension x 15 B  Prone ball squeezes 3 sec x 10  Extensions RTB TA act x 15  Rows RTB TA act x 15  Hip abd iso 5 sec x 10               HEP: Access Code: [email or web address]  URL: [email or web address]  Prepared by: Oleg Chavez     Exercises  - Standing Quadratus Lumborum Stretch with Doorway  - 1 x daily - 7 x weekly - 1 sets - 10 reps - 15 sec hold  - Standing Hip Flexor Stretch  - 1 x daily - 7 x weekly - 5 reps - 20 - 30 sec hold  - Supine Lower Trunk Rotation  - 1 x daily - 7 x weekly - 2 sets - 10 reps - 5 sec hold  - Sidelying Thoracic Rotation with Open Book  - 1 x daily - 7 x weekly - 1-2 sets - 10 reps - 5 sec hold  - Seated Thoracic Extension with Hands Behind Neck  - 1 x daily - 7 x weekly - 2 sets - 10 reps  - Standing Row with Anchored Resistance with TA activation- 1 x daily - 7 x weekly - 2 sets - 10 reps  ------------------------------------------------------------------------  - Clamshell  - 1 x daily - 7 x weekly - 2 sets - 10 reps  - Hip Flexor Stretch at Edge of Bed  - 1 x daily - 7 x weekly - 3-5 reps - 20-30sec hold    Charges: 1 TE; 1 MT 1 NRE  Total Timed Treatment: 42 min  Total Treatment Time: 42 min

## 2024-10-07 ENCOUNTER — OFFICE VISIT (OUTPATIENT)
Dept: PHYSICAL THERAPY | Facility: HOSPITAL | Age: 74
End: 2024-10-07
Attending: INTERNAL MEDICINE
Payer: MEDICARE

## 2024-10-07 PROCEDURE — 97110 THERAPEUTIC EXERCISES: CPT

## 2024-10-07 PROCEDURE — 97112 NEUROMUSCULAR REEDUCATION: CPT

## 2024-10-07 PROCEDURE — 97140 MANUAL THERAPY 1/> REGIONS: CPT

## 2024-10-07 NOTE — PROGRESS NOTES
Diagnosis:   Acute right-sided back pain, unspecified back location (M54.9)        Referring Provider: Stephen Modi  Date of Evaluation:    7/17/2024    Precautions:   ASHD Next MD visit:   none scheduled  Date of Surgery: n/a     Insurance Primary/Secondary: [place] / [employer]     # Auth Visits: 12 per POC            Subjective: Pt reports he is going to go for a walk later today for the first time in a while. He reports no exacerbation of sx post therapy last session.     Pain: 0/10 R lumbar       Objective:   Rx. See flow chart:  9/25/2024:  LE   Hip flexion (L2): R 4/5; L 4/5  Hip abduction: R 3-/5; L 3-/5  Hip Extension: R 4/*5; L 4/5            Hip ER: R 4+/5; L 5/5  Hip IR: R 4+/5; L 5/5  Knee Flexion: R 5/5; L 5/5            Knee extension (L3): R 4/5; L 5/5            DF (L4): R 5/5; L 5/5  PF (S1): R 5/5; L 5/5     Lumbar AROM: (* denotes performed with pain)  Flexion: 45  Extension: 10  Sidebending: R 5 L 8  Rotation: R 80% wnl; L 80% wnl    Severe hip flexor restrictions bilaterally    Observation/Posture:   Kyphotic, forward head, rounded shoulders, hip hinge    Accessory Motion:   Thoracic hypomobility  -------------------------------------  8/8/2024:  Lumbar AROM: (* denotes performed with pain)  Flexion: 50  Extension: 10  Sidebending: R 15; L 10  Rotation: R 80% wnl; L 80% wnl     -----------------------------  At IE:   Observation/Posture:   Kyphotic, forward head, rounded shoulders  Sits with a posterior pelvic tilt     Neuro Screen: Light touch intact. Denies BLE N/T     Lumbar AROM: (* denotes performed with pain)  Flexion: 50  Extension: 15  Sidebending: R 15; L 10  Rotation: R 60% wnl; L 60% wnl     Accessory motion:   Thoracic hypomobility PA  Upper Lumbar hypomobility PA  Lower Lumbar - WNL - PA     Palpation:   Denies TTP B: PS, QL, Lat, RTC,      Strength: (* denotes performed with pain)  UE/Scapular LE   Shoulder Flex: R 4+/5, L 5/5  Shoulder ABD (C5): R 5/5, L 5/5  Shoulder  ER: R 5/5, L 4+/5  Shoulder IR: R 5/5, L 5/5     Rhomboids: R 4/5, L 4/5  Mid trap: R 4/5; L 4/5  Lats: R 4/5* pain across back, L 4/5  Low trap: R 3/5; L 3/5    Hip flexion (L2): R 5/5; L 5/5  Hip abduction: R 4/5; L 4/5  Hip Extension: R 4/5; L 4/5            Hip ER: R 5/5; L 5/5  Hip IR: R 5/5; L 5/5  Knee Flexion: R 5/5; L 5/5            Knee extension (L3): R 5/5; L 5/5            DF (L4): R 5/5; L 5/5  PF (S1): R 5/5; L 5/5      Flexibility:   LE   Hip Flexor: R Severe restrictions, L Severe restrictions  Hamstrings: R Severe restrictions; L Severe restrictions  Piriformis: R WNL; L WNL  Quads: R Min restrictions; L Min restrictions  Gastroc-soleus: R Min restrictions; L Min restrictions         Gait: pt ambulates on level ground with antalgia, stooped posture/forward lean, trendelenburg/waddle, and slowed charlie .  Balance: SLS R 30 sec, L 30 sec    Assessment: Focus on thoracic mobility, continued hypomobility noted. Addition of prone flys to improve strength and promote an upright posture. No exacerbation of sx reported today, continue to progress scapular, core and hip strength as able to improve pt participate in standing and walking pain-free.         Goals:    (to be met in 12 visits)   Pt will improve transversus abdominis recruitment to perform proper isometric contraction without requiring verbal or tactile cuing to promote advancement of therex   Pt will demonstrate good understanding of proper posture and body mechanics to decrease pain and improve spinal safety   Pt will improve lumbar spine AROM flexion to >55 deg to allow increase ease with bending forward to don shoes   Pt will have decreased paraspinal mm tension to tolerate standing >45 minutes for work and home activities   Pt will demonstrate improved core strength to be able to perform sitting with <2/10 pain   Pt will be independent and compliant with comprehensive HEP to maintain progress achieved in PT   Pt will improve hip ABD and ER  strength to 4+/5 to increase ease with standing and walking      Plan: Patient will be seen for 1-2 x/week or a total of 12 visits over a 90 day period. Treatment will include: Gait training, Manual Therapy, Neuromuscular Re-education, Therapeutic Activities, Therapeutic Exercise, and Home Exercise Program instruction     Certification From: 9/25/2024  To:12/24/2024     Date: 7/19/2024  TX#: 2/10 Date:7/22/2024                 TX#: 3/10 Date: 7/25/2024           TX#: 4/10 Date:8/1/2024                 TX#: 5/10 Date: 8/8/2024  Tx#: 6/10 Date: 9/25/2024  Tx#: 7/12  PN Date: 10/2/2024  Tx#: 8/12 Date: 10/7/2024  Tx#: 9/12   TE: 30'   Nu-Step lvl 1 - 5 min  SKTC 5 sec x 10 B  SB roll outs fwd & lat 5 sec x 10 each  HEP review TE: 33'   Nu-Step lvl 2 - 5 min  SB roll outs fwd & lat 5 sec x 10 each  Prone Press up to tolerance x12   Seated thoracic extension, x15   Bridging, 2x10  SB DKTC, 2x10  Standing rows, GTB B 2x10 TE: 20'   SB roll outs fwd & lat 5 sec x 10 each  Prone Press up (unable) - performed prone on elbows to tolerance x 10   Seated thoracic extension arms behind head - chest open 2 x10, (reports of \"feels good\")  SB DKTC, 2x10 (relieves pain in upper back) TE: 10'   Prone on elbows x 10   DKTC w/ towel assist 5 sec x 10   Seated thoracic extension/lumbar with arms behind head x 15   TE: 10'   Bridge with abd iso x 15   Clamshells x 15 B TE: 30'  Reassessment  Hip flexor stretch w/ PT over pressure 30 sec x 2 B  Clamshell x15 B TE: 12'  Hip flexor stretch w/ PT over pressure 30 sec x 3 B  SBDKTC x 10   Slider hip abd x 10 B   TE: 12'  Hip flexor stretch w/ PT over pressure 30 sec x 3 B  SBDKTC x 10   Slider hip abd x 10 B   MT: 15'   STM - PS, Lat, QL R   Thoracic CPA grade II  Upper lower CPA grade II MT: 12'  STM - PS, Lat, QL R   Thoracic CPA grade II  Upper lower CPA grade II MT: 15'  STM - PS, Lat, QL R   Thoracic CPA grade II  Upper lumbar CPA grade II MT: 15'  STM - PS, Lat, QL B   Thoracic CPA  grade II  Upper lumbar CPA grade II MT: 15'  STM - PS, Lat, QL B   Thoracic CPA grade II  Upper lumbar CPA grade II MT: 10'   R PS - STM  Thoracic CPA grade II   MT: 15'   R PS - STM  Thoracic CPA grade II MT: 15'   R PS - STM  Thoracic CPA grade II-III   NRE: 10'  PPT 5 sec x 12   TA recruit w/ abd iso w/ belt 3 sec x 10  NRE: 10'  Rows GTB with TA recruitment x 20 (cueing to look up)  TA recruitment with bridges x 15 NRE: 20'  Rows GTB with TA recruitment x 20 (cueing to look up)  TA recruitment with bridges x 15  Extensions cueing to look up and keep core engaged YTB x 15  Pallof press YTB x 5 B (cueing to keep head up) NRE: 15'  TA recruit with supine march x 10   Extensions RTB x 20 (cueing needed to keep head up & limit downward gaze)  Pallof press YTB x 5 B    NRE: 15'  Prone shoulder extension x 15 B  Prone ball squeezes 3 sec x 10  Extensions RTB TA act x 15  Rows RTB TA act x 15  Hip abd iso 5 sec x 10    NRE: 18'  Prone shoulder extension x 15 B  Prone ball squeezes 3 sec x 10  Extensions RTB TA act x 15  Rows RTB TA act x 15  Hip abd iso 5 sec x 10   Prone flys x 15             HEP: Access Code: [email or web address]  URL: [email or web address]  Prepared by: Oleg Chavez     Exercises  - Standing Quadratus Lumborum Stretch with Doorway  - 1 x daily - 7 x weekly - 1 sets - 10 reps - 15 sec hold  - Standing Hip Flexor Stretch  - 1 x daily - 7 x weekly - 5 reps - 20 - 30 sec hold  - Supine Lower Trunk Rotation  - 1 x daily - 7 x weekly - 2 sets - 10 reps - 5 sec hold  - Sidelying Thoracic Rotation with Open Book  - 1 x daily - 7 x weekly - 1-2 sets - 10 reps - 5 sec hold  - Seated Thoracic Extension with Hands Behind Neck  - 1 x daily - 7 x weekly - 2 sets - 10 reps  - Standing Row with Anchored Resistance with TA activation- 1 x daily - 7 x weekly - 2 sets - 10 reps  ------------------------------------------------------------------------  - Clamshell  - 1 x daily - 7 x weekly - 2 sets - 10 reps  -  Hip Flexor Stretch at Edge of Bed  - 1 x daily - 7 x weekly - 3-5 reps - 20-30sec hold    Charges: 1 TE; 1 MT 1 NRE  Total Timed Treatment: 45 min  Total Treatment Time: 45 min

## 2024-10-15 ENCOUNTER — OFFICE VISIT (OUTPATIENT)
Dept: PHYSICAL THERAPY | Facility: HOSPITAL | Age: 74
End: 2024-10-15
Attending: INTERNAL MEDICINE
Payer: MEDICARE

## 2024-10-15 PROCEDURE — 97110 THERAPEUTIC EXERCISES: CPT

## 2024-10-15 PROCEDURE — 97112 NEUROMUSCULAR REEDUCATION: CPT

## 2024-10-15 PROCEDURE — 97140 MANUAL THERAPY 1/> REGIONS: CPT

## 2024-10-15 NOTE — PROGRESS NOTES
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 He also reports walking between 0.25 and 0.50 miles without back sx. Reporting he hasn't been walking since back pain began so this was the first time.     Pain: 0/10 R lumbar       Objective:   Rx. See flow chart:    LE   Hip flexion (L2): R 4/5; L 4/5  Hip abduction: R 3-/5; L 3-/5  Hip Extension: R 4/*5; L 4/5            Hip ER: R 4+/5; L 5/5  Hip IR: R 4+/5; L 5/5  Knee Flexion: R 5/5; L 5/5            Knee extension (L3): R 4/5; L 5/5            DF (L4): R 5/5; L 5/5  PF (S1): R 5/5; L 5/5   --------------------  9/25/2024:  Lumbar AROM: (* denotes performed with pain)  Flexion: 45  Extension: 10  Sidebending: R 5 L 8  Rotation: R 80% wnl; L 80% wnl    Severe hip flexor restrictions bilaterally    Observation/Posture:   Kyphotic, forward head, rounded shoulders, hip hinge    Accessory Motion:   Thoracic hypomobility  -------------------------------------  8/8/2024:  Lumbar AROM: (* denotes performed with pain)  Flexion: 50  Extension: 10  Sidebending: R 15; L 10  Rotation: R 80% wnl; L 80% wnl     -----------------------------  At IE:   Observation/Posture:   Kyphotic, forward head, rounded shoulders  Sits with a posterior pelvic tilt     Neuro Screen: Light touch intact. Denies BLE N/T     Lumbar AROM: (* denotes performed with pain)  Flexion: 50  Extension: 15  Sidebending: R 15; L 10  Rotation: R 60% wnl; L 60% wnl     Accessory motion:   Thoracic hypomobility PA  Upper Lumbar hypomobility PA  Lower Lumbar - WNL - PA      Palpation:   Denies TTP B: PS, QL, Lat, RTC,      Strength: (* denotes performed with pain)  UE/Scapular LE   Shoulder Flex: R 4+/5, L 5/5  Shoulder ABD (C5): R 5/5, L 5/5  Shoulder ER: R 5/5, L 4+/5  Shoulder IR: R 5/5, L 5/5     Rhomboids: R 4/5, L 4/5  Mid trap: R 4/5; L 4/5  Lats: R 4/5* pain across back, L 4/5  Low trap: R 3/5; L 3/5    Hip flexion (L2): R 5/5; L 5/5  Hip abduction: R 4/5; L 4/5  Hip Extension: R 4/5; L 4/5            Hip ER: R 5/5; L 5/5  Hip IR: R 5/5; L 5/5  Knee Flexion: R 5/5; L 5/5            Knee extension (L3): R 5/5; L 5/5            DF (L4): R 5/5; L 5/5  PF (S1): R 5/5; L 5/5      Flexibility:   LE   Hip Flexor: R Severe restrictions, L Severe restrictions  Hamstrings: R Severe restrictions; L Severe restrictions  Piriformis: R WNL; L WNL  Quads: R Min restrictions; L Min restrictions  Gastroc-soleus: R Min restrictions; L Min restrictions         Gait: pt ambulates on level ground with antalgia, stooped posture/forward lean, trendelenburg/waddle, and slowed charlie .  Balance: SLS R 30 sec, L 30 sec    Assessment:  Pt is progressing well. He reports ability to participate in daily activities such as walking and working on his home, without excerebration of back sx. Thoracic mobility is still limited, but MT and exercises focused on improving this to promote an upright posture. B hamstring restrictions addressed today.         Goals:    (to be met in 12 visits)   Pt will improve transversus abdominis recruitment to perform proper isometric contraction without requiring verbal or tactile cuing to promote advancement of therex   Pt will demonstrate good understanding of proper posture and body mechanics to decrease pain and improve spinal safety   Pt will improve lumbar spine AROM flexion to >55 deg to allow increase ease with bending forward to don shoes   Pt will have decreased paraspinal mm tension to tolerate standing >45 minutes for work and home activities   Pt will demonstrate  improved core strength to be able to perform sitting with <2/10 pain   Pt will be independent and compliant with comprehensive HEP to maintain progress achieved in PT   Pt will improve hip ABD and ER strength to 4+/5 to increase ease with standing and walking      Plan: Patient will be seen for 1-2 x/week or a total of 12 visits over a 90 day period. Treatment will include: Gait training, Manual Therapy, Neuromuscular Re-education, Therapeutic Activities, Therapeutic Exercise, and Home Exercise Program instruction     Certification From: 9/25/2024  To:12/24/2024     Date: 7/19/2024  TX#: 2/10 Date:7/22/2024                 TX#: 3/10 Date: 7/25/2024           TX#: 4/10 Date:8/1/2024                 TX#: 5/10 Date: 8/8/2024  Tx#: 6/10 Date: 9/25/2024  Tx#: 7/12  PN Date: 10/2/2024  Tx#: 8/12 Date: 10/7/2024  Tx#: 9/12 Date: 10/15/2024  Tx#: 10/12   TE: 30'   Nu-Step lvl 1 - 5 min  SKTC 5 sec x 10 B  SB roll outs fwd & lat 5 sec x 10 each  HEP review TE: 33'   Nu-Step lvl 2 - 5 min  SB roll outs fwd & lat 5 sec x 10 each  Prone Press up to tolerance x12   Seated thoracic extension, x15   Bridging, 2x10  SB DKTC, 2x10  Standing rows, GTB B 2x10 TE: 20'   SB roll outs fwd & lat 5 sec x 10 each  Prone Press up (unable) - performed prone on elbows to tolerance x 10   Seated thoracic extension arms behind head - chest open 2 x10, (reports of \"feels good\")  SB DKTC, 2x10 (relieves pain in upper back) TE: 10'   Prone on elbows x 10   DKTC w/ towel assist 5 sec x 10   Seated thoracic extension/lumbar with arms behind head x 15   TE: 10'   Bridge with abd iso x 15   Clamshells x 15 B TE: 30'  Reassessment  Hip flexor stretch w/ PT over pressure 30 sec x 2 B  Clamshell x15 B TE: 12'  Hip flexor stretch w/ PT over pressure 30 sec x 3 B  SBDKTC x 10   Slider hip abd x 10 B   TE: 12'  Hip flexor stretch w/ PT over pressure 30 sec x 3 B  SBDKTC x 10   Slider hip abd x 10 B TE: 10'  Hip flexor stretch w/ PT over pressure 30 sec x 3  B  Hamstring stretch 15 sec x 3 B   MT: 15'   STM - PS, Lat, QL R   Thoracic CPA grade II  Upper lower CPA grade II MT: 12'  STM - PS, Lat, QL R   Thoracic CPA grade II  Upper lower CPA grade II MT: 15'  STM - PS, Lat, QL R   Thoracic CPA grade II  Upper lumbar CPA grade II MT: 15'  STM - PS, Lat, QL B   Thoracic CPA grade II  Upper lumbar CPA grade II MT: 15'  STM - PS, Lat, QL B   Thoracic CPA grade II  Upper lumbar CPA grade II MT: 10'   R PS - STM  Thoracic CPA grade II   MT: 15'   R PS - STM  Thoracic CPA grade II MT: 15'   R PS - STM  Thoracic CPA grade II-III MT: 15'   R PS - STM  Thoracic CPA grade II-III   NRE: 10'  PPT 5 sec x 12   TA recruit w/ abd iso w/ belt 3 sec x 10  NRE: 10'  Rows GTB with TA recruitment x 20 (cueing to look up)  TA recruitment with bridges x 15 NRE: 20'  Rows GTB with TA recruitment x 20 (cueing to look up)  TA recruitment with bridges x 15  Extensions cueing to look up and keep core engaged YTB x 15  Pallof press YTB x 5 B (cueing to keep head up) NRE: 15'  TA recruit with supine march x 10   Extensions RTB x 20 (cueing needed to keep head up & limit downward gaze)  Pallof press YTB x 5 B    NRE: 15'  Prone shoulder extension x 15 B  Prone ball squeezes 3 sec x 10  Extensions RTB TA act x 15  Rows RTB TA act x 15  Hip abd iso 5 sec x 10    NRE: 18'  Prone shoulder extension x 15 B  Prone ball squeezes 3 sec x 10  Extensions RTB TA act x 15  Rows RTB TA act x 15  Hip abd iso 5 sec x 10   Prone flys x 15 NRE: 18'  Prone shoulder extension x 10 B #1  Prone ball squeezes 5 sec x 8  Extensions GTB TA act x 10  Rows GTB TA act x 10  Prone flys x 10 #1              HEP: Access Code: [email or web address]  URL: [email or web address]  Prepared by: Oleg Chavez     Exercises  - Standing Quadratus Lumborum Stretch with Doorway  - 1 x daily - 7 x weekly - 1 sets - 10 reps - 15 sec hold  - Standing Hip Flexor Stretch  - 1 x daily - 7 x weekly - 5 reps - 20 - 30 sec hold  - Supine Lower  Trunk Rotation  - 1 x daily - 7 x weekly - 2 sets - 10 reps - 5 sec hold  - Sidelying Thoracic Rotation with Open Book  - 1 x daily - 7 x weekly - 1-2 sets - 10 reps - 5 sec hold  - Seated Thoracic Extension with Hands Behind Neck  - 1 x daily - 7 x weekly - 2 sets - 10 reps  - Standing Row with Anchored Resistance with TA activation- 1 x daily - 7 x weekly - 2 sets - 10 reps  ------------------------------------------------------------------------  - Clamshell  - 1 x daily - 7 x weekly - 2 sets - 10 reps  - Hip Flexor Stretch at Edge of Bed  - 1 x daily - 7 x weekly - 3-5 reps - 20-30sec hold    Charges: 1 TE; 1 MT 1 NRE  Total Timed Treatment: 43 min  Total Treatment Time: 43 min

## 2024-10-22 ENCOUNTER — OFFICE VISIT (OUTPATIENT)
Dept: PHYSICAL THERAPY | Facility: HOSPITAL | Age: 74
End: 2024-10-22
Attending: INTERNAL MEDICINE
Payer: MEDICARE

## 2024-10-22 PROCEDURE — 97110 THERAPEUTIC EXERCISES: CPT

## 2024-10-22 PROCEDURE — 97140 MANUAL THERAPY 1/> REGIONS: CPT

## 2024-10-22 PROCEDURE — 97112 NEUROMUSCULAR REEDUCATION: CPT

## 2024-10-29 ENCOUNTER — TELEPHONE (OUTPATIENT)
Dept: PHYSICAL THERAPY | Facility: HOSPITAL | Age: 74
End: 2024-10-29

## 2024-10-30 ENCOUNTER — OFFICE VISIT (OUTPATIENT)
Dept: PHYSICAL THERAPY | Facility: HOSPITAL | Age: 74
End: 2024-10-30
Attending: INTERNAL MEDICINE
Payer: MEDICARE

## 2024-10-30 PROCEDURE — 97140 MANUAL THERAPY 1/> REGIONS: CPT

## 2024-10-30 PROCEDURE — 97110 THERAPEUTIC EXERCISES: CPT

## 2024-10-30 PROCEDURE — 97112 NEUROMUSCULAR REEDUCATION: CPT

## 2024-10-30 NOTE — PROGRESS NOTES
Progress Summary  Pt has attended 12 visits in Physical Therapy.    Diagnosis:   Acute right-sided back pain, unspecified back location (M54.9)        Referring Provider: Stephen Modi  Date of Evaluation:    7/17/2024    Precautions:   ASHD Next MD visit:   none scheduled  Date of Surgery: n/a     Insurance Primary/Secondary: [place] [employer]     # Auth Visits: 12 per POC  (12/24/2024)          Subjective: Pt reports he is feeling very good today. He reports no pain today and/or no pain on his walk yesterday.     Pain: 0/10 R lumbar       Objective:   Rx. See flow chart:    LE - 10/30/2024   Hip flexion (L2): R 5/5; L 4+/5  Hip abduction: R 3/5; L 3/5  Hip Extension: R 4/5; L 4/5 (limited ROM B)       Hip ER: R 5/5; L 5/5  Hip IR: R 5/5; L 5/5  Knee Flexion: R 5/5; L 5/5            Knee extension (L3): R 5/5; L 5/5            DF (L4): R 5/5; L 5/5  PF (S1): R 5/5; L 5/5   --------------------  9/25/2024:  Lumbar AROM: (* denotes performed with pain)  Flexion: 45  Extension: 10  Sidebending: R 5 L 8  Rotation: R 80% wnl; L 80% wnl    Severe hip flexor restrictions bilaterally    Observation/Posture:   Kyphotic, forward head, rounded shoulders, hip hinge    Accessory Motion:   Thoracic hypomobility  -------------------------------------  8/8/2024:  Lumbar AROM: (* denotes performed with pain)  Flexion: 50  Extension: 10  Sidebending: R 15; L 10  Rotation: R 80% wnl; L 80% wnl     -----------------------------  At IE:   Observation/Posture:   Kyphotic, forward head, rounded shoulders  Sits with a posterior pelvic tilt     Neuro Screen: Light touch intact. Denies BLE N/T     Lumbar AROM: (* denotes performed with pain)  Flexion: 50  Extension: 15  Sidebending: R 15; L 10  Rotation: R 60% wnl; L 60% wnl     Accessory motion:   Thoracic hypomobility PA  Upper Lumbar hypomobility PA  Lower Lumbar - WNL - PA     Palpation:   Denies TTP B: PS, QL, Lat, RTC,      Strength: (* denotes performed with  pain)  UE/Scapular LE   Shoulder Flex: R 4+/5, L 5/5  Shoulder ABD (C5): R 5/5, L 5/5  Shoulder ER: R 5/5, L 4+/5  Shoulder IR: R 5/5, L 5/5     Rhomboids: R 4/5, L 4/5  Mid trap: R 4/5; L 4/5  Lats: R 4/5* pain across back, L 4/5  Low trap: R 3/5; L 3/5    Hip flexion (L2): R 5/5; L 5/5  Hip abduction: R 4/5; L 4/5  Hip Extension: R 4/5; L 4/5            Hip ER: R 5/5; L 5/5  Hip IR: R 5/5; L 5/5  Knee Flexion: R 5/5; L 5/5            Knee extension (L3): R 5/5; L 5/5            DF (L4): R 5/5; L 5/5  PF (S1): R 5/5; L 5/5      Flexibility:   LE   Hip Flexor: R Severe restrictions, L Severe restrictions  Hamstrings: R Severe restrictions; L Severe restrictions  Piriformis: R WNL; L WNL  Quads: R Min restrictions; L Min restrictions  Gastroc-soleus: R Min restrictions; L Min restrictions         Gait: pt ambulates on level ground with antalgia, stooped posture/forward lean, trendelenburg/waddle, and slowed charlie .  Balance: SLS R 30 sec, L 30 sec    Assessment: Patient has progressed well in regard to spinal safety awareness, core activation, and reduction in pain levels. Pt continues to demonstrate hip strength deficits, recommend 1-3 more PT sessions to continue to build strength and learn/perform exercises correctly to improve transfer and carryover to strengthening independently at home through [facility].       Goals:    (to be met in 16 visits)   Pt will improve transversus abdominis recruitment to perform proper isometric contraction without requiring verbal or tactile cuing to promote advancement of therex MET  Pt will demonstrate good understanding of proper posture and body mechanics to decrease pain and improve spinal safety MET  Pt will improve lumbar spine AROM flexion to >55 deg to allow increase ease with bending forward to don shoes Ongoing  Pt will have decreased paraspinal mm tension to tolerate standing >45 minutes for work and home activities   Pt will demonstrate improved core strength to be able to  perform sitting with <2/10 pain MET  Pt will be independent and compliant with comprehensive HEP to maintain progress achieved in PT MET for current program  Pt will improve hip ABD and ER strength to 4+/5 to increase ease with standing and walking progressing (pt reports pain-free with standing and walking)     Plan: Patient will be seen for 1-2 x/week or a total of 16 visits over a 90 day period. Treatment will include: Gait training, Manual Therapy, Neuromuscular Re-education, Therapeutic Activities, Therapeutic Exercise, and Home Exercise Program instruction     Patient/Family/Caregiver was advised of these findings, precautions, and treatment options and has agreed to actively participate in planning and for this course of care.    Thank you for your referral. If you have any questions, please contact me at Dept: [phone].    Sincerely,  Electronically signed by therapist: Oleg Chavez, PT     Physician's certification required:  Yes  Please co-sign or sign and return this letter via fax as soon as possible to [phone].   I certify the need for these services furnished under this plan of treatment and while under my care.    X___________________________________________________ Date____________________    Certification From: 10/30/2024  To:1/28/2025      Date: 7/19/2024  TX#: 2/10 Date:7/22/2024                 TX#: 3/10 Date: 7/25/2024           TX#: 4/10 Date:8/1/2024                 TX#: 5/10 Date: 8/8/2024  Tx#: 6/10 Date: 9/25/2024  Tx#: 7/12  PN Date: 10/2/2024  Tx#: 8/12 Date: 10/7/2024  Tx#: 9/12 Date: 10/15/2024  Tx#: 10/12 Date: 10/22/2024  Tx#: 11/12 Date: 10/30/2024  Tx#: 12/12   TE: 30'   Nu-Step lvl 1 - 5 min  SKTC 5 sec x 10 B  SB roll outs fwd & lat 5 sec x 10 each  HEP review TE: 33'   Nu-Step lvl 2 - 5 min  SB roll outs fwd & lat 5 sec x 10 each  Prone Press up to tolerance x12   Seated thoracic extension, x15   Bridging, 2x10  SB DKTC, 2x10  Standing rows, GTB B 2x10 TE: 20'   SB roll  outs fwd & lat 5 sec x 10 each  Prone Press up (unable) - performed prone on elbows to tolerance x 10   Seated thoracic extension arms behind head - chest open 2 x10, (reports of \"feels good\")  SB DKTC, 2x10 (relieves pain in upper back) TE: 10'   Prone on elbows x 10   DKTC w/ towel assist 5 sec x 10   Seated thoracic extension/lumbar with arms behind head x 15   TE: 10'   Bridge with abd iso x 15   Clamshells x 15 B TE: 30'  Reassessment  Hip flexor stretch w/ PT over pressure 30 sec x 2 B  Clamshell x15 B TE: 12'  Hip flexor stretch w/ PT over pressure 30 sec x 3 B  SBDKTC x 10   Slider hip abd x 10 B   TE: 12'  Hip flexor stretch w/ PT over pressure 30 sec x 3 B  SBDKTC x 10   Slider hip abd x 10 B TE: 10'  Hip flexor stretch w/ PT over pressure 30 sec x 3 B  Hamstring stretch 15 sec x 3 B TE: 15'  Hip flexor stretch w/ PT over pressure 30 sec x 3 B  Hamstring stretch 15 sec x 3 B  POC discussion TE: 15'  Standing hip abd in front of mirror - cueing to ensure side movement and no compensation x 10 B  Slider extension x 10 B  Standing flexion x 10  Side stepping at ballet bar 10 ft x 2 YTB    MT: 15'   STM - PS, Lat, QL R   Thoracic CPA grade II  Upper lower CPA grade II MT: 12'  STM - PS, Lat, QL R   Thoracic CPA grade II  Upper lower CPA grade II MT: 15'  STM - PS, Lat, QL R   Thoracic CPA grade II  Upper lumbar CPA grade II MT: 15'  STM - PS, Lat, QL B   Thoracic CPA grade II  Upper lumbar CPA grade II MT: 15'  STM - PS, Lat, QL B   Thoracic CPA grade II  Upper lumbar CPA grade II MT: 10'   R PS - STM  Thoracic CPA grade II   MT: 15'   R PS - STM  Thoracic CPA grade II MT: 15'   R PS - STM  Thoracic CPA grade II-III MT: 15'   R PS - STM  Thoracic CPA grade II-III MT: 15'   R PS - STM  Thoracic CPA grade II-III MT: 15'   R PS - STM  Thoracic CPA grade II-III   NRE: 10'  PPT 5 sec x 12   TA recruit w/ abd iso w/ belt 3 sec x 10  NRE: 10'  Rows GTB with TA recruitment x 20 (cueing to look up)  TA recruitment with  bridges x 15 NRE: 20'  Rows GTB with TA recruitment x 20 (cueing to look up)  TA recruitment with bridges x 15  Extensions cueing to look up and keep core engaged YTB x 15  Pallof press YTB x 5 B (cueing to keep head up) NRE: 15'  TA recruit with supine march x 10   Extensions RTB x 20 (cueing needed to keep head up & limit downward gaze)  Pallof press YTB x 5 B    NRE: 15'  Prone shoulder extension x 15 B  Prone ball squeezes 3 sec x 10  Extensions RTB TA act x 15  Rows RTB TA act x 15  Hip abd iso 5 sec x 10    NRE: 18'  Prone shoulder extension x 15 B  Prone ball squeezes 3 sec x 10  Extensions RTB TA act x 15  Rows RTB TA act x 15  Hip abd iso 5 sec x 10   Prone flys x 15 NRE: 18'  Prone shoulder extension x 10 B #1  Prone ball squeezes 5 sec x 8  Extensions GTB TA act x 10  Rows GTB TA act x 10  Prone flys x 10 #1 NRE: 15'  Prone shoulder extension x 10 B #1  Prone ball squeezes 10 sec x 5  Prone flys x 10 #1  Supine HABD RTB x 10   Supine JAZZY RTB x 10 NRE: 15'  Back against wall: HABD RTB x 10   Back against wall: JAZZY RTB x 10  TrA step outs ytb x 5 (cueing throughout for form)                HEP: Access Code: [email or web address]  URL: [email or web address]  Prepared by: Oleg Chavez     Exercises  - Standing Quadratus Lumborum Stretch with Doorway  - 1 x daily - 7 x weekly - 1 sets - 10 reps - 15 sec hold  - Standing Hip Flexor Stretch  - 1 x daily - 7 x weekly - 5 reps - 20 - 30 sec hold  - Supine Lower Trunk Rotation  - 1 x daily - 7 x weekly - 2 sets - 10 reps - 5 sec hold  - Sidelying Thoracic Rotation with Open Book  - 1 x daily - 7 x weekly - 1-2 sets - 10 reps - 5 sec hold  - Seated Thoracic Extension with Hands Behind Neck  - 1 x daily - 7 x weekly - 2 sets - 10 reps  - Standing Row with Anchored Resistance with TA activation- 1 x daily - 7 x weekly - 2 sets - 10 reps  ------------------------------------------------------------------------  - Clamshell  - 1 x daily - 7 x weekly - 2 sets - 10  reps  - Hip Flexor Stretch at Edge of Bed  - 1 x daily - 7 x weekly - 3-5 reps - 20-30sec hold    Charges: 1 TE; 1 MT 1 NRE  Total Timed Treatment: 45 min  Total Treatment Time: 45 min

## 2024-11-07 ENCOUNTER — OFFICE VISIT (OUTPATIENT)
Dept: PHYSICAL THERAPY | Facility: HOSPITAL | Age: 74
End: 2024-11-07
Attending: INTERNAL MEDICINE
Payer: MEDICARE

## 2024-11-07 PROCEDURE — 97110 THERAPEUTIC EXERCISES: CPT

## 2024-11-07 PROCEDURE — 97140 MANUAL THERAPY 1/> REGIONS: CPT

## 2024-11-07 PROCEDURE — 97112 NEUROMUSCULAR REEDUCATION: CPT

## 2024-11-07 NOTE — PROGRESS NOTES
Diagnosis:   Acute right-sided back pain, unspecified back location (M54.9)        Referring Provider: Stephen Modi  Date of Evaluation:    7/17/2024    Precautions:   ASHD Next MD visit:   none scheduled  Date of Surgery: n/a     Insurance Primary/Secondary: [place] / [place]     # Auth Visits: 16 per POC  (1/28/2025)          Subjective: Pt reports no pain today. Pt reports he is feeling more capable in ability to progress with exercises and walking at home.    Pain: 0/10 R lumbar       Objective:   Rx. See flow chart:    LE - 10/30/2024   Hip flexion (L2): R 5/5; L 4+/5  Hip abduction: R 3/5; L 3/5  Hip Extension: R 4/5; L 4/5 (limited ROM B)       Hip ER: R 5/5; L 5/5  Hip IR: R 5/5; L 5/5  Knee Flexion: R 5/5; L 5/5            Knee extension (L3): R 5/5; L 5/5            DF (L4): R 5/5; L 5/5  PF (S1): R 5/5; L 5/5   --------------------  9/25/2024:  Lumbar AROM: (* denotes performed with pain)  Flexion: 45  Extension: 10  Sidebending: R 5 L 8  Rotation: R 80% wnl; L 80% wnl    Severe hip flexor restrictions bilaterally    Observation/Posture:   Kyphotic, forward head, rounded shoulders, hip hinge    Accessory Motion:   Thoracic hypomobility  -------------------------------------  8/8/2024:  Lumbar AROM: (* denotes performed with pain)  Flexion: 50  Extension: 10  Sidebending: R 15; L 10  Rotation: R 80% wnl; L 80% wnl     -----------------------------  At IE:   Observation/Posture:   Kyphotic, forward head, rounded shoulders  Sits with a posterior pelvic tilt     Neuro Screen: Light touch intact. Denies BLE N/T     Lumbar AROM: (* denotes performed with pain)  Flexion: 50  Extension: 15  Sidebending: R 15; L 10  Rotation: R 60% wnl; L 60% wnl     Accessory motion:   Thoracic hypomobility PA  Upper Lumbar hypomobility PA  Lower Lumbar - WNL - PA     Palpation:   Denies TTP B: PS, QL, Lat, RTC,      Strength: (* denotes performed with pain)  UE/Scapular LE   Shoulder Flex: R 4+/5, L 5/5  Shoulder  ABD (C5): R 5/5, L 5/5  Shoulder ER: R 5/5, L 4+/5  Shoulder IR: R 5/5, L 5/5     Rhomboids: R 4/5, L 4/5  Mid trap: R 4/5; L 4/5  Lats: R 4/5* pain across back, L 4/5  Low trap: R 3/5; L 3/5    Hip flexion (L2): R 5/5; L 5/5  Hip abduction: R 4/5; L 4/5  Hip Extension: R 4/5; L 4/5            Hip ER: R 5/5; L 5/5  Hip IR: R 5/5; L 5/5  Knee Flexion: R 5/5; L 5/5            Knee extension (L3): R 5/5; L 5/5            DF (L4): R 5/5; L 5/5  PF (S1): R 5/5; L 5/5      Flexibility:   LE   Hip Flexor: R Severe restrictions, L Severe restrictions  Hamstrings: R Severe restrictions; L Severe restrictions  Piriformis: R WNL; L WNL  Quads: R Min restrictions; L Min restrictions  Gastroc-soleus: R Min restrictions; L Min restrictions         Gait: pt ambulates on level ground with antalgia, stooped posture/forward lean, trendelenburg/waddle, and slowed charlie .  Balance: SLS R 30 sec, L 30 sec    Assessment: Discussed POC. Pt has made good progress and reports no pain with daily activities. Plan to discharge next session with HEP.       Goals:    (to be met in 16 visits)   Pt will improve transversus abdominis recruitment to perform proper isometric contraction without requiring verbal or tactile cuing to promote advancement of therex MET  Pt will demonstrate good understanding of proper posture and body mechanics to decrease pain and improve spinal safety MET  Pt will improve lumbar spine AROM flexion to >55 deg to allow increase ease with bending forward to don shoes Ongoing  Pt will have decreased paraspinal mm tension to tolerate standing >45 minutes for work and home activities   Pt will demonstrate improved core strength to be able to perform sitting with <2/10 pain MET  Pt will be independent and compliant with comprehensive HEP to maintain progress achieved in PT MET for current program  Pt will improve hip ABD and ER strength to 4+/5 to increase ease with standing and walking progressing (pt reports pain-free with  standing and walking)     Plan: Patient will be seen for 1-2 x/week or a total of 16 visits over a 90 day period. Treatment will include: Gait training, Manual Therapy, Neuromuscular Re-education, Therapeutic Activities, Therapeutic Exercise, and Home Exercise Program instruction   Discharge next visit    Certification From: 10/30/2024  To:1/28/2025      Date: 7/19/2024  TX#: 2/10 Date:7/22/2024                 TX#: 3/10 Date: 7/25/2024           TX#: 4/10 Date:8/1/2024                 TX#: 5/10 Date: 8/8/2024  Tx#: 6/10 Date: 9/25/2024  Tx#: 7/12  PN Date: 10/2/2024  Tx#: 8/12 Date: 10/7/2024  Tx#: 9/12 Date: 10/15/2024  Tx#: 10/12 Date: 10/22/2024  Tx#: 11/12 Date: 10/30/2024  Tx#: 12/12 Date: 11/7/2024  Tx#: 13/16   TE: 30'   Nu-Step lvl 1 - 5 min  SKTC 5 sec x 10 B  SB roll outs fwd & lat 5 sec x 10 each  HEP review TE: 33'   Nu-Step lvl 2 - 5 min  SB roll outs fwd & lat 5 sec x 10 each  Prone Press up to tolerance x12   Seated thoracic extension, x15   Bridging, 2x10  SB DKTC, 2x10  Standing rows, GTB B 2x10 TE: 20'   SB roll outs fwd & lat 5 sec x 10 each  Prone Press up (unable) - performed prone on elbows to tolerance x 10   Seated thoracic extension arms behind head - chest open 2 x10, (reports of \"feels good\")  SB DKTC, 2x10 (relieves pain in upper back) TE: 10'   Prone on elbows x 10   DKTC w/ towel assist 5 sec x 10   Seated thoracic extension/lumbar with arms behind head x 15   TE: 10'   Bridge with abd iso x 15   Clamshells x 15 B TE: 30'  Reassessment  Hip flexor stretch w/ PT over pressure 30 sec x 2 B  Clamshell x15 B TE: 12'  Hip flexor stretch w/ PT over pressure 30 sec x 3 B  SBDKTC x 10   Slider hip abd x 10 B   TE: 12'  Hip flexor stretch w/ PT over pressure 30 sec x 3 B  SBDKTC x 10   Slider hip abd x 10 B TE: 10'  Hip flexor stretch w/ PT over pressure 30 sec x 3 B  Hamstring stretch 15 sec x 3 B TE: 15'  Hip flexor stretch w/ PT over pressure 30 sec x 3 B  Hamstring stretch 15 sec x 3 B  POC  discussion TE: 15'  Standing hip abd in front of mirror - cueing to ensure side movement and no compensation x 10 B  Slider extension x 10 B  Standing flexion x 10  Side stepping at ballet bar 10 ft x 2 YTB  TE: 15'  Slider - flex/abd/exten x 10 B  Side stepping at ballet bar 10 ft x 3  Hip abd/extend YTB x 10 B     MT: 15'   STM - PS, Lat, QL R   Thoracic CPA grade II  Upper lower CPA grade II MT: 12'  STM - PS, Lat, QL R   Thoracic CPA grade II  Upper lower CPA grade II MT: 15'  STM - PS, Lat, QL R   Thoracic CPA grade II  Upper lumbar CPA grade II MT: 15'  STM - PS, Lat, QL B   Thoracic CPA grade II  Upper lumbar CPA grade II MT: 15'  STM - PS, Lat, QL B   Thoracic CPA grade II  Upper lumbar CPA grade II MT: 10'   R PS - STM  Thoracic CPA grade II   MT: 15'   R PS - STM  Thoracic CPA grade II MT: 15'   R PS - STM  Thoracic CPA grade II-III MT: 15'   R PS - STM  Thoracic CPA grade II-III MT: 15'   R PS - STM  Thoracic CPA grade II-III MT: 15'   R PS - STM  Thoracic CPA grade II-III MT: 15'   R PS - STM  Thoracic CPA grade II-III   NRE: 10'  PPT 5 sec x 12   TA recruit w/ abd iso w/ belt 3 sec x 10  NRE: 10'  Rows GTB with TA recruitment x 20 (cueing to look up)  TA recruitment with bridges x 15 NRE: 20'  Rows GTB with TA recruitment x 20 (cueing to look up)  TA recruitment with bridges x 15  Extensions cueing to look up and keep core engaged YTB x 15  Pallof press YTB x 5 B (cueing to keep head up) NRE: 15'  TA recruit with supine march x 10   Extensions RTB x 20 (cueing needed to keep head up & limit downward gaze)  Pallof press YTB x 5 B    NRE: 15'  Prone shoulder extension x 15 B  Prone ball squeezes 3 sec x 10  Extensions RTB TA act x 15  Rows RTB TA act x 15  Hip abd iso 5 sec x 10    NRE: 18'  Prone shoulder extension x 15 B  Prone ball squeezes 3 sec x 10  Extensions RTB TA act x 15  Rows RTB TA act x 15  Hip abd iso 5 sec x 10   Prone flys x 15 NRE: 18'  Prone shoulder extension x 10 B #1  Prone ball  squeezes 5 sec x 8  Extensions GTB TA act x 10  Rows GTB TA act x 10  Prone flys x 10 #1 NRE: 15'  Prone shoulder extension x 10 B #1  Prone ball squeezes 10 sec x 5  Prone flys x 10 #1  Supine HABD RTB x 10   Supine JAZZY RTB x 10 NRE: 15'  Back against wall: HABD RTB x 10   Back against wall: JAZZY RTB x 10  TrA step outs ytb x 5 (cueing throughout for form) NRE: 15'  Back against wall: HABD RTB x 10   Back against wall: JAZZY RTB x 10  TrA step outs ytb x 5 (cueing throughout for form)                 HEP: Access Code: [email or web address]  URL: [email or web address]  Prepared by: Oleg Chavez     Exercises  - Standing Quadratus Lumborum Stretch with Doorway  - 1 x daily - 7 x weekly - 1 sets - 10 reps - 15 sec hold  - Standing Hip Flexor Stretch  - 1 x daily - 7 x weekly - 5 reps - 20 - 30 sec hold  - Supine Lower Trunk Rotation  - 1 x daily - 7 x weekly - 2 sets - 10 reps - 5 sec hold  - Sidelying Thoracic Rotation with Open Book  - 1 x daily - 7 x weekly - 1-2 sets - 10 reps - 5 sec hold  - Seated Thoracic Extension with Hands Behind Neck  - 1 x daily - 7 x weekly - 2 sets - 10 reps  - Standing Row with Anchored Resistance with TA activation- 1 x daily - 7 x weekly - 2 sets - 10 reps  ------------------------------------------------------------------------  - Clamshell  - 1 x daily - 7 x weekly - 2 sets - 10 reps  - Hip Flexor Stretch at Edge of Bed  - 1 x daily - 7 x weekly - 3-5 reps - 20-30sec hold    Charges: 1 TE; 1 MT 1 NRE  Total Timed Treatment: 45 min  Total Treatment Time: 45 min

## 2024-11-11 ENCOUNTER — APPOINTMENT (OUTPATIENT)
Dept: PHYSICAL THERAPY | Facility: HOSPITAL | Age: 74
End: 2024-11-11
Attending: INTERNAL MEDICINE
Payer: MEDICARE

## 2024-11-18 ENCOUNTER — APPOINTMENT (OUTPATIENT)
Dept: PHYSICAL THERAPY | Facility: HOSPITAL | Age: 74
End: 2024-11-18
Attending: INTERNAL MEDICINE
Payer: MEDICARE

## 2025-01-15 ENCOUNTER — OFFICE VISIT (OUTPATIENT)
Dept: INTERNAL MEDICINE CLINIC | Facility: CLINIC | Age: 75
End: 2025-01-15
Payer: MEDICARE

## 2025-01-15 VITALS
BODY MASS INDEX: 24.68 KG/M2 | OXYGEN SATURATION: 98 % | SYSTOLIC BLOOD PRESSURE: 115 MMHG | HEIGHT: 69 IN | HEART RATE: 73 BPM | DIASTOLIC BLOOD PRESSURE: 76 MMHG | WEIGHT: 166.63 LBS

## 2025-01-15 DIAGNOSIS — I25.10 ASHD (ARTERIOSCLEROTIC HEART DISEASE): ICD-10-CM

## 2025-01-15 DIAGNOSIS — M54.9 ACUTE RIGHT-SIDED BACK PAIN, UNSPECIFIED BACK LOCATION: ICD-10-CM

## 2025-01-15 DIAGNOSIS — R49.0 DYSPHONIA: ICD-10-CM

## 2025-01-15 DIAGNOSIS — M54.50 ACUTE LEFT-SIDED LOW BACK PAIN WITHOUT SCIATICA: Primary | ICD-10-CM

## 2025-01-15 DIAGNOSIS — E78.5 DYSLIPIDEMIA: ICD-10-CM

## 2025-01-15 DIAGNOSIS — D69.6 THROMBOCYTOPENIA (HCC): ICD-10-CM

## 2025-01-15 PROCEDURE — 99214 OFFICE O/P EST MOD 30 MIN: CPT | Performed by: INTERNAL MEDICINE

## 2025-01-15 NOTE — PROGRESS NOTES
Emeterio Hawkins Jr. is a 74 year old male with complaints of:  Chief Complaint: Establish Care    HPI     Emeterio Hawkins Jr. is a(n) 74 year old male with a history of CAD, ED, low back pain, who presents to establish [facility].     Overall, feeling well.     He has undergone PT for the right sided back pain. Over the course of PT, he developed left sided back pain. He now has a resolution of the back pain. He has also put in a home gym, where he continues to do his HEP.     CAD. Noted to have elevated coronary calcium score in 2/2024 --> total total calcium score 630, with 498 in LAD alone, indicating mod-severe plaque. He has follows up with [facility]. He used to be on rosuvastatin 10 mg daily, but cut that in half to 5 mg daily as he felt like that was contributing to mental fog. Also has bilateral carotid artery stenosis.     Following with ENT for ceruminosis and chronic dysphonia. 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 He was referred to laryngology at the [facility], has followed up. He has been doing some voice exercises.     Past Medical History     Past Medical History:    ASHD (arteriosclerotic heart disease)    Subclinical; cardiac CT 2-24 with calcium score 630 (LAD greater than RCA greater than LCx)    Erectile dysfunction    Thrombocytopenia (HCC)        Past Surgical History     Past Surgical History:   Procedure Laterality Date    Colonoscopy N/A 4/27/2022    Procedure: COLONOSCOPY;  Surgeon: Guero Corey MD;  Location: [facility] ENDOSCOPY    Foot surgery Right 2003    Vasectomy  1983        Family History     Family History   Problem Relation Age of Onset    Breast Cancer Mother     Stroke Mother         Age 86    Other (Tongue Cancer) Mother     Heart Disease Father         MI age 85    Colon Cancer Father         Age 80s    Other (Non-Hodgkin's Lymphoma) Sister     Diabetes Sister        Social History     Social History     Socioeconomic History    Marital  status:    Tobacco Use    Smoking status: Never    Smokeless tobacco: Never   Vaping Use    Vaping status: Never Used   Substance and Sexual Activity    Alcohol use: No    Drug use: No    Sexual activity: Not Currently     Partners: Female   Other Topics Concern    Caffeine Concern Yes     Comment: coffee     Social Drivers of Health      Received from [facility]    Social Connections    Received from [facility], [facility]    Housing Stability       Allergies     Allergies[1]    Current Medications     Current Outpatient Medications   Medication Sig Dispense Refill    rosuvastatin 5 MG Oral Tab Take 1 tablet (5 mg total) by mouth nightly.      saccharomyces boulardii 250 MG Oral Cap Take 1 capsule (250 mg total) by mouth daily.      Multiple Vitamins-Minerals (MULTIVITAMIN OR) Take 1 tablet by mouth daily.       No current facility-administered medications for this visit.       Review of Systems     GENERAL HEALTH: feels well otherwise  SKIN: denies any unusual skin lesions or rashes  RESPIRATORY: denies shortness of breath with exertion  CARDIOVASCULAR: denies chest pain on exertion  GI: denies abdominal pain and denies heartburn  : denies any burning with urination, urinary frequency or urgency  NEURO: denies headaches, numbness or tingling, mental status changes  PSYCH: denies depressed mood, anxiety  MUSC: denies muscle aches, joint pain    Physical Exam     /76 (BP Location: Right arm, Patient Position: Sitting, Cuff Size: adult)   Pulse 73   Ht 5' 9\" (1.753 m)   Wt 166 lb 9.6 oz (75.6 kg)   SpO2 98%   BMI 24.60 kg/m²     GENERAL: well developed, well nourished,in no apparent distress  SKIN: no rashes,no suspicious lesions  HEENT: atraumatic, normocephalic,ears and throat are clear  NECK: supple,no adenopathy,no bruits  LUNGS: clear to auscultation  CARDIO: RRR without murmur  GI: good BS's,no  masses, HSM or tenderness  EXTREMITIES: no cyanosis, clubbing or edema    Assessment and Plan     Diagnoses and all orders for this visit:    Acute left-sided low back pain without sciatica. Acute right-sided back pain, unspecified back location. He is undergone PT. Continues HEP. Pain has resolved.     Thrombocytopenia (HCC). Likely ITP. Hep C, HIV neg. Check yearly.     Dyslipidemia.  Patient has been continuing on rosuvastatin 5 mg daily, which she had cut down from 10 mg daily due to some mental fog.  He states that the mental fogginess is a little bit better, but does continue.  Plan to recheck lipid panel at next physical, can consider changing rosuvastatin dose to every other day.    ASHD (arteriosclerotic heart disease).  Elevated coronary calcium score.  Otherwise patient is completely asymptomatic.  No angina or equivalent.  Normal cardiopulmonary exam today.  Continues to follow with cardiology.  Continues on statin.    Dysphonia.  Following with laryngology.  Doing voice exercises.          rosuvastatin 5 MG Oral Tab Take 1 tablet (5 mg total) by mouth nightly.      saccharomyces boulardii 250 MG Oral Cap Take 1 capsule (250 mg total) by mouth daily.      Multiple Vitamins-Minerals (MULTIVITAMIN OR) Take 1 tablet by mouth daily.         Requested Prescriptions      No prescriptions requested or ordered in this encounter       No orders of the defined types were placed in this encounter.      No follow-ups on file.    The patient indicates understanding of these issues and agrees to the plan.    Electronically signed by Arabella Moss MD 01/15/25             [1]   Allergies  Allergen Reactions    Gold Salts RASH

## 2025-01-28 ENCOUNTER — TELEPHONE (OUTPATIENT)
Dept: INTERNAL MEDICINE CLINIC | Facility: CLINIC | Age: 75
End: 2025-01-28

## 2025-01-28 DIAGNOSIS — Z12.5 PROSTATE CANCER SCREENING: Primary | ICD-10-CM

## 2025-01-28 DIAGNOSIS — Z00.00 ANNUAL PHYSICAL EXAM: ICD-10-CM

## 2025-01-28 NOTE — TELEPHONE ENCOUNTER
Detail Level: Detailed Patient states he has a physical scheduled for February 3rd and is asking if lab work can be ordered. Please advise.   Add 34038 Cpt? (Important Note: In 2017 The Use Of 82998 Is Being Tracked By Cms To Determine Future Global Period Reimbursement For Global Periods): yes

## 2025-01-28 NOTE — TELEPHONE ENCOUNTER
Called and spoke to patient, informed him Dr. Moss has ordered your blood work. Instructed patient to fast before having blood drawn. Patient understanding.

## 2025-01-30 ENCOUNTER — LAB ENCOUNTER (OUTPATIENT)
Dept: LAB | Facility: HOSPITAL | Age: 75
End: 2025-01-30
Attending: INTERNAL MEDICINE
Payer: MEDICARE

## 2025-01-30 DIAGNOSIS — Z00.00 ANNUAL PHYSICAL EXAM: ICD-10-CM

## 2025-01-30 DIAGNOSIS — Z12.5 PROSTATE CANCER SCREENING: ICD-10-CM

## 2025-01-30 LAB
ALBUMIN SERPL-MCNC: 4.6 G/DL (ref 3.2–4.8)
ALBUMIN/GLOB SERPL: 1.8 {RATIO} (ref 1–2)
ALP LIVER SERPL-CCNC: 52 U/L
ALT SERPL-CCNC: 22 U/L
ANION GAP SERPL CALC-SCNC: 6 MMOL/L (ref 0–18)
AST SERPL-CCNC: 23 U/L (ref ?–34)
BASOPHILS # BLD AUTO: 0.04 X10(3) UL (ref 0–0.2)
BASOPHILS NFR BLD AUTO: 0.9 %
BILIRUB SERPL-MCNC: 0.8 MG/DL (ref 0.2–1.1)
BUN BLD-MCNC: 21 MG/DL (ref 9–23)
BUN/CREAT SERPL: 20.2 (ref 10–20)
CALCIUM BLD-MCNC: 9.8 MG/DL (ref 8.7–10.4)
CHLORIDE SERPL-SCNC: 105 MMOL/L (ref 98–112)
CHOLEST SERPL-MCNC: 127 MG/DL (ref ?–200)
CO2 SERPL-SCNC: 30 MMOL/L (ref 21–32)
COMPLEXED PSA SERPL-MCNC: 0.64 NG/ML (ref ?–4)
CREAT BLD-MCNC: 1.04 MG/DL
DEPRECATED RDW RBC AUTO: 42.1 FL (ref 35.1–46.3)
EGFRCR SERPLBLD CKD-EPI 2021: 76 ML/MIN/1.73M2 (ref 60–?)
EOSINOPHIL # BLD AUTO: 0.11 X10(3) UL (ref 0–0.7)
EOSINOPHIL NFR BLD AUTO: 2.4 %
ERYTHROCYTE [DISTWIDTH] IN BLOOD BY AUTOMATED COUNT: 12.5 % (ref 11–15)
FASTING PATIENT LIPID ANSWER: YES
FASTING STATUS PATIENT QL REPORTED: YES
GLOBULIN PLAS-MCNC: 2.5 G/DL (ref 2–3.5)
GLUCOSE BLD-MCNC: 93 MG/DL (ref 70–99)
HCT VFR BLD AUTO: 40.8 %
HDLC SERPL-MCNC: 56 MG/DL (ref 40–59)
HGB BLD-MCNC: 14 G/DL
IMM GRANULOCYTES # BLD AUTO: 0.01 X10(3) UL (ref 0–1)
IMM GRANULOCYTES NFR BLD: 0.2 %
LDLC SERPL CALC-MCNC: 60 MG/DL (ref ?–100)
LYMPHOCYTES # BLD AUTO: 1.4 X10(3) UL (ref 1–4)
LYMPHOCYTES NFR BLD AUTO: 30.8 %
MCH RBC QN AUTO: 31.3 PG (ref 26–34)
MCHC RBC AUTO-ENTMCNC: 34.3 G/DL (ref 31–37)
MCV RBC AUTO: 91.3 FL
MONOCYTES # BLD AUTO: 0.5 X10(3) UL (ref 0.1–1)
MONOCYTES NFR BLD AUTO: 11 %
NEUTROPHILS # BLD AUTO: 2.49 X10 (3) UL (ref 1.5–7.7)
NEUTROPHILS # BLD AUTO: 2.49 X10(3) UL (ref 1.5–7.7)
NEUTROPHILS NFR BLD AUTO: 54.7 %
NONHDLC SERPL-MCNC: 71 MG/DL (ref ?–130)
OSMOLALITY SERPL CALC.SUM OF ELEC: 295 MOSM/KG (ref 275–295)
PLATELET # BLD AUTO: 132 10(3)UL (ref 150–450)
POTASSIUM SERPL-SCNC: 4.3 MMOL/L (ref 3.5–5.1)
PROT SERPL-MCNC: 7.1 G/DL (ref 5.7–8.2)
RBC # BLD AUTO: 4.47 X10(6)UL
SODIUM SERPL-SCNC: 141 MMOL/L (ref 136–145)
TRIGL SERPL-MCNC: 47 MG/DL (ref 30–149)
TSI SER-ACNC: 1.91 UIU/ML (ref 0.55–4.78)
VLDLC SERPL CALC-MCNC: 7 MG/DL (ref 0–30)
WBC # BLD AUTO: 4.6 X10(3) UL (ref 4–11)

## 2025-01-30 PROCEDURE — 85025 COMPLETE CBC W/AUTO DIFF WBC: CPT

## 2025-01-30 PROCEDURE — 84443 ASSAY THYROID STIM HORMONE: CPT

## 2025-01-30 PROCEDURE — 80061 LIPID PANEL: CPT

## 2025-01-30 PROCEDURE — 36415 COLL VENOUS BLD VENIPUNCTURE: CPT

## 2025-01-30 PROCEDURE — 80053 COMPREHEN METABOLIC PANEL: CPT

## 2025-02-03 ENCOUNTER — OFFICE VISIT (OUTPATIENT)
Dept: INTERNAL MEDICINE CLINIC | Facility: CLINIC | Age: 75
End: 2025-02-03
Payer: MEDICARE

## 2025-02-03 VITALS
DIASTOLIC BLOOD PRESSURE: 76 MMHG | HEIGHT: 69 IN | HEART RATE: 72 BPM | WEIGHT: 167.81 LBS | BODY MASS INDEX: 24.86 KG/M2 | SYSTOLIC BLOOD PRESSURE: 122 MMHG

## 2025-02-03 DIAGNOSIS — M54.50 ACUTE LEFT-SIDED LOW BACK PAIN WITHOUT SCIATICA: ICD-10-CM

## 2025-02-03 DIAGNOSIS — M54.9 ACUTE RIGHT-SIDED BACK PAIN, UNSPECIFIED BACK LOCATION: ICD-10-CM

## 2025-02-03 DIAGNOSIS — R49.0 DYSPHONIA: ICD-10-CM

## 2025-02-03 DIAGNOSIS — E78.5 DYSLIPIDEMIA: ICD-10-CM

## 2025-02-03 DIAGNOSIS — Z00.00 ENCOUNTER FOR GENERAL ADULT MEDICAL EXAMINATION WITHOUT ABNORMAL FINDINGS: Primary | ICD-10-CM

## 2025-02-03 DIAGNOSIS — Z12.11 ENCOUNTER FOR SCREENING COLONOSCOPY: ICD-10-CM

## 2025-02-03 DIAGNOSIS — I25.10 ASHD (ARTERIOSCLEROTIC HEART DISEASE): ICD-10-CM

## 2025-02-03 DIAGNOSIS — D69.6 THROMBOCYTOPENIA: ICD-10-CM

## 2025-02-03 DIAGNOSIS — Z12.5 ENCOUNTER FOR PROSTATE CANCER SCREENING: ICD-10-CM

## 2025-02-03 NOTE — PROGRESS NOTES
REASON FOR VISIT      Emeterio Hawkins Jr. is a 74 year old male who presents for  Medicare Subsequent Annual Wellness visit (Pt already had Initial Annual Wellness) and scheduled follow up of multiple significant but stable problems.     HCM   - colon: last done 4/27/22, no need for repeat  - PSA: UTD   - labs: UTD   - imm: Flu shot? COVID booster? Shingles? PNA? Flu shot UTD,COVID is done, shingles was done at [facility], PNA is done  - depression:    He has undergone PT for the right sided back pain. Over the course of PT, he developed left sided back pain. He now has a resolution of the back pain. He has also put in a home gym, where he continues to do his HEP.      CAD. Noted to have elevated coronary calcium score in 2/2024 --> total total calcium score 630, with 498 in LAD alone, indicating mod-severe plaque. He has follows up with [facility]. He used to be on rosuvastatin 10 mg daily, but cut that in half to 5 mg daily as he felt like that was contributing to mental fog. Also has bilateral carotid artery stenosis.      Following with ENT for ceruminosis and chronic dysphonia. 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 He was referred to laryngology at the [facility], has followed up. He has been doing some voice exercises.        Past Medical History     Past Medical History:    ASHD (arteriosclerotic heart disease)    Subclinical; cardiac CT 2-24 with calcium score 630 (LAD greater than RCA greater than LCx)    Erectile dysfunction    Thrombocytopenia        Past Surgical History     Past Surgical History:   Procedure Laterality Date    Colonoscopy N/A 4/27/2022    Procedure: COLONOSCOPY;  Surgeon: Guero Corey MD;  Location: [facility] ENDOSCOPY    Foot surgery Right 2003    Vasectomy  1983        Family History     Family History   Problem Relation Age of Onset    Breast Cancer Mother     Stroke Mother         Age 86    Other (Tongue Cancer) Mother     Heart Disease Father          MI age 85    Colon Cancer Father         Age 80s    Other (Non-Hodgkin's Lymphoma) Sister     Diabetes Sister        Social History     Social History     Socioeconomic History    Marital status:    Tobacco Use    Smoking status: Never    Smokeless tobacco: Never   Vaping Use    Vaping status: Never Used   Substance and Sexual Activity    Alcohol use: No    Drug use: No    Sexual activity: Not Currently     Partners: Female   Other Topics Concern    Caffeine Concern Yes     Comment: coffee     Social Drivers of Health      Received from [facility], [facility]    Social Connections    Received from [facility], [facility]    Housing Stability       Tobacco:   She currently has tobacco smoking, smokeless, or e-cigarette status listed as never assessed or unknown.    Please update the information in the Tobacco history section to reflect the true status, and refresh this smartlink.    CAGE Alcohol Screen:   Cage screening not needed because reported NO to Alcohol use on social history.    Depression Screening (PHQ):  PHQ-2/9 not done in last week, please ask questions. Last done              SHAVON-7 Total Score                 Allergies     Allergies[1]    Current Medications     Current Outpatient Medications   Medication Sig Dispense Refill    rosuvastatin 5 MG Oral Tab Take 1 tablet (5 mg total) by mouth nightly.      saccharomyces boulardii 250 MG Oral Cap Take 1 capsule (250 mg total) by mouth daily.      Multiple Vitamins-Minerals (MULTIVITAMIN OR) Take 1 tablet by mouth daily.       No current facility-administered medications for this visit.       Screenings     History/Other:   Fall Risk Assessment:    (Incomplete)        Cognitive Assessment:    (Incomplete)  Tip  Cognitive assessment incomplete. Refresh to ensure screening pulls in; if not,click link above to assess, then refresh:[id number]}      Functional Ability/Status:     (Incomplete)      Immunization History   Administered Date(s) Administered    Covid-19 Vaccine Pfizer 30 mcg/0.3 ml 02/17/2021, 03/10/2021, 09/30/2021    FLU VAC High Dose 65 YRS & Older PRSV Free (94832) 11/12/2019, 12/12/2024    FLUAD High Dose 65 yr and older (89455) 12/01/2018, 09/11/2020, 11/15/2021    Pneumococcal (Prevnar 13) 12/01/2018    Pneumococcal Conjugate PCV20 02/19/2024    Pneumovax 23 12/02/2019       Health Maintenance   Topic Date Due    COVID-19 Vaccine (4 - 2024-25 season) 09/01/2024    Annual Physical  02/19/2025    HTN: BP Follow-Up  03/03/2025    Zoster Vaccines (1 of 2) 05/03/2025 (Originally 3/14/2002)    PSA  01/30/2027    Colorectal Cancer Screening  04/27/2032    Influenza Vaccine  Completed    Annual Depression Screening  Completed    Fall Risk Screening (Annual)  Completed    Pneumococcal Vaccine: 50+ Years  Completed    Meningococcal B Vaccine  Aged Out         Review of Systems     GENERAL HEALTH: feels well otherwise  SKIN: denies any unusual skin lesions or rashes  RESPIRATORY: denies shortness of breath with exertion  CARDIOVASCULAR: denies chest pain on exertion  GI: denies abdominal pain and denies heartburn  : denies any burning with urination, urinary frequency or urgency  NEURO: denies headaches, numbness or tingling, mental status changes  PSYCH: denies depressed mood, anxiety  MUSC: denies muscle aches, joint pain      Physical Exam     /76   Pulse 72   Ht 5' 9\" (1.753 m)   Wt 167 lb 12.8 oz (76.1 kg)   BMI 24.78 kg/m²   >   BP Readings from Last 3 Encounters:   02/03/25 122/76   01/15/25 115/76   08/27/24 150/72       GENERAL: well developed, well nourished, in no apparent distress  SKIN: no rashes, no suspicious lesions  HEENT: atraumatic, normocephalic, ears and throat are clear                Hearing Assessed via: Finger Rub  EYES: PERRLA, EOMI, conjunctiva are clear    CHEST: no chest tenderness  LUNGS: clear to auscultation  CARDIO: RRR without  murmur  GI: good BS's, no masses, HSM or tenderness  : two descended testicles, no masses, no hernia and no penile lesions  RECTAL: deferred  MUSCULOSKELETAL: back is not tender, FROM of the back  EXTREMITIES: no cyanosis, clubbing or edema  NEURO: Oriented times three, cranial nerves are intact, motor and sensory are grossly intact  FOOT: normal exam      Assessment and Plan     Emeterio Hawkins Jr. is a 74 year old male who presents for an Annual Health Assessment.     Diagnoses and all orders for this visit:    Encounter for general adult medical examination without abnormal findings. Age appropriate screenings and vaccinations discussed. Counseled on healthy diet, exercise, sleep hygiene. Patient advised that any additional concerns not included in a routine physical may result in additional charges and/or a copay. Patient verbally acknowledged this information and agreed to proceed.  Will need to get records from [facility] -- patient is not sure when or if he received the RSV and shingles shots.     Encounter for screening colonoscopy. He has completed screening according to Dr. Mcdowell     Encounter for prostate cancer screening. UTD, WNL, plan to stop at 77    Acute left-sided low back pain without sciatica. Acute right-sided back pain, unspecified back location. Has resolved.     Thrombocytopenia. Stable. Check yearly. HCV, HIV neg. Plan to refer to heme if PLTs start falling or abnormalities in other cell lines are noted.     Dyslipidemia. Cont current dose of statin.     ASHD (arteriosclerotic heart disease). Hx as above. Cont statin.     Dysphonia. Cont voice exercises.          The patient indicates understanding of these issues and agrees to the plan.  The patient is asked to return in 6 months for office visit  Diet counseling perfomed  Exercise counseling perfomed    Electronically signed by Arabella Moss MD 02/03/25         [1]   Allergies  Allergen Reactions    Gold Salts RASH

## 2025-03-19 ENCOUNTER — MED REC SCAN ONLY (OUTPATIENT)
Dept: INTERNAL MEDICINE CLINIC | Facility: CLINIC | Age: 75
End: 2025-03-19

## 2025-04-07 ENCOUNTER — HOSPITAL ENCOUNTER (OUTPATIENT)
Dept: GENERAL RADIOLOGY | Facility: HOSPITAL | Age: 75
Discharge: HOME | End: 2025-04-07
Attending: INTERNAL MEDICINE
Payer: MEDICARE

## 2025-04-07 ENCOUNTER — OFFICE VISIT (OUTPATIENT)
Dept: INTERNAL MEDICINE CLINIC | Facility: CLINIC | Age: 75
End: 2025-04-07

## 2025-04-07 VITALS
RESPIRATION RATE: 20 BRPM | WEIGHT: 168 LBS | BODY MASS INDEX: 24.88 KG/M2 | OXYGEN SATURATION: 98 % | HEIGHT: 69 IN | HEART RATE: 69 BPM | TEMPERATURE: 99 F | DIASTOLIC BLOOD PRESSURE: 76 MMHG | SYSTOLIC BLOOD PRESSURE: 139 MMHG

## 2025-04-07 DIAGNOSIS — M25.562 ACUTE PAIN OF LEFT KNEE: Primary | ICD-10-CM

## 2025-04-07 DIAGNOSIS — M25.562 ACUTE PAIN OF LEFT KNEE: ICD-10-CM

## 2025-04-07 PROBLEM — E78.5 DYSLIPIDEMIA: Status: ACTIVE | Noted: 2024-03-18

## 2025-04-07 PROBLEM — H25.9 AGE-RELATED CATARACT: Status: ACTIVE | Noted: 2025-02-08

## 2025-04-07 PROBLEM — H04.123 DRY EYES: Status: ACTIVE | Noted: 2020-01-24

## 2025-04-07 PROCEDURE — 73564 X-RAY EXAM KNEE 4 OR MORE: CPT | Performed by: INTERNAL MEDICINE

## 2025-04-07 PROCEDURE — 99214 OFFICE O/P EST MOD 30 MIN: CPT | Performed by: INTERNAL MEDICINE

## 2025-04-07 NOTE — PROGRESS NOTES
Emeterio Hawkins Jr. is a 75 year old male with complaints of:  Chief Complaint: Fall and Knee Pain (Left knee pain.)    HPI     Emeterio Hawkins Jr. is a(n) 75 year old male with a history of back pain, hyperlipidemia, CAD, ceruminosis, chronic dysphonia, who presents for fall.    Patient was carrying a high-backed dining room chair up the stairs. The chair legs caught on a step and the patient fell. He landed on his left knee and the left shoulder. Patient had an abrasion on his knee. Has a hematoma/some kind of swelling. Having trouble bearing weight on the knee. There is minimal swelling. Has trouble sitting down or standing up from a seated position. No worsening weakness.      Past Medical History     Past Medical History:    ASHD (arteriosclerotic heart disease)    Subclinical; cardiac CT 2-24 with calcium score 630 (LAD greater than RCA greater than LCx)    Erectile dysfunction    Thrombocytopenia        Past Surgical History     Past Surgical History:   Procedure Laterality Date    Colonoscopy N/A 4/27/2022    Procedure: COLONOSCOPY;  Surgeon: Guero Corey MD;  Location: [facility] ENDOSCOPY    Foot surgery Right 2003    Vasectomy  1983        Family History     Family History   Problem Relation Age of Onset    Breast Cancer Mother     Stroke Mother         Age 86    Other (Tongue Cancer) Mother     Heart Disease Father         MI age 85    Colon Cancer Father         Age 80s    Other (Non-Hodgkin's Lymphoma) Sister     Diabetes Sister        Social History     Social History     Socioeconomic History    Marital status:    Tobacco Use    Smoking status: Never    Smokeless tobacco: Never   Vaping Use    Vaping status: Never Used   Substance and Sexual Activity    Alcohol use: No    Drug use: No    Sexual activity: Not Currently     Partners: Female   Other Topics Concern    Caffeine Concern Yes     Comment: coffee     Social Drivers of Health     Food Insecurity: No Food Insecurity (4/7/2025)     NCSS - Food Insecurity     Worried About Running Out of Food in the Last Year: No     Ran Out of Food in the Last Year: No   Transportation Needs: No Transportation Needs (4/7/2025)    NCSS - Transportation     Lack of Transportation: No   Housing Stability: Not At Risk (4/7/2025)    NCSS - Housing/Utilities     Has Housing: Yes     Worried About Losing Housing: No     Unable to Get Utilities: No       Allergies     Allergies[1]    Current Medications     Current Outpatient Medications   Medication Sig Dispense Refill    rosuvastatin 5 MG Oral Tab Take 1 tablet (5 mg total) by mouth nightly.      Multiple Vitamins-Minerals (MULTIVITAMIN OR) Take 1 tablet by mouth daily.      saccharomyces boulardii 250 MG Oral Cap Take 1 capsule (250 mg total) by mouth daily.       No current facility-administered medications for this visit.       Review of Systems     GENERAL HEALTH: feels well otherwise  SKIN: denies any unusual skin lesions or rashes  RESPIRATORY: denies shortness of breath with exertion  CARDIOVASCULAR: denies chest pain on exertion  GI: denies abdominal pain and denies heartburn  : denies any burning with urination, urinary frequency or urgency  NEURO: denies headaches, numbness or tingling, mental status changes  PSYCH: denies depressed mood, anxiety  MUSC: denies muscle aches, joint pain    Physical Exam     /76 (BP Location: Left arm, Patient Position: Sitting, Cuff Size: adult)   Pulse 69   Temp 98.6 °F (37 °C) (Temporal)   Resp 20   Ht 5' 9\" (1.753 m)   Wt 168 lb (76.2 kg)   SpO2 98%   BMI 24.81 kg/m²     GENERAL: well developed, well nourished,in no apparent distress  SKIN: 1.5 cm abrasion with scab on anterior aspect of L knee, no drainage or fluctuance involved   EXTREMITIES: no cyanosis, clubbing or edema. L knee with exquisite tenderness inferior to the patella with bony protrusion noted     Assessment and Plan     Assessment & Plan  Acute pain of left knee  Concern for fracture given  degree of tenderness. Get XR now. Further recommendations to follow after XR.   Orders:    XR KNEE, COMPLETE (4 OR MORE VIEWS), LEFT (CPT=73564); Future          rosuvastatin 5 MG Oral Tab Take 1 tablet (5 mg total) by mouth nightly.      Multiple Vitamins-Minerals (MULTIVITAMIN OR) Take 1 tablet by mouth daily.         Requested Prescriptions      No prescriptions requested or ordered in this encounter       No orders of the defined types were placed in this encounter.      No follow-ups on file.    The patient indicates understanding of these issues and agrees to the plan.    Electronically signed by Arabella Moss MD 04/07/25             [1]   Allergies  Allergen Reactions    Gold Salts RASH

## 2025-05-02 ENCOUNTER — OFFICE VISIT (OUTPATIENT)
Dept: INTERNAL MEDICINE CLINIC | Facility: CLINIC | Age: 75
End: 2025-05-02
Payer: MEDICARE

## 2025-05-02 VITALS
HEART RATE: 71 BPM | BODY MASS INDEX: 24.91 KG/M2 | SYSTOLIC BLOOD PRESSURE: 150 MMHG | DIASTOLIC BLOOD PRESSURE: 74 MMHG | OXYGEN SATURATION: 99 % | TEMPERATURE: 97 F | HEIGHT: 69 IN | RESPIRATION RATE: 18 BRPM | WEIGHT: 168.19 LBS

## 2025-05-02 DIAGNOSIS — R25.1 TREMOR: Primary | ICD-10-CM

## 2025-05-02 PROBLEM — I25.10 ARTERIOSCLEROSIS OF CORONARY ARTERY: Status: ACTIVE | Noted: 2024-02-19

## 2025-05-02 PROCEDURE — 99214 OFFICE O/P EST MOD 30 MIN: CPT | Performed by: INTERNAL MEDICINE

## 2025-05-02 NOTE — PROGRESS NOTES
Emeterio Hawkins Jr. is a 75 year old male with complaints of:  Chief Complaint: Tremors (Right hand.)    HPI     Emeterio Hawkins Jr. is a(n) 75 year old male with a history of back pain, hyperlipidemia, CAD, ceruminosis, chronic dysphonia, who presents for tremors.    Knee has improved.     He was noted to have a tremor while at his orthopedist's office. R > L. Wife noticed that the tremor started 4-6 weeks ago. Patient has a tremor with fine motor movements, but also has the tremor at rest.     Past Medical History     Past Medical History[1]     Past Surgical History     Past Surgical History[2]     Family History     Family History[3]    Social History     Short Social Hx on File[4]    Allergies     Allergies[5]    Current Medications     Current Outpatient Medications   Medication Sig Dispense Refill    rosuvastatin 5 MG Oral Tab Take 1 tablet (5 mg total) by mouth nightly.      saccharomyces boulardii 250 MG Oral Cap Take 1 capsule (250 mg total) by mouth daily.      Multiple Vitamins-Minerals (MULTIVITAMIN OR) Take 1 tablet by mouth daily.       No current facility-administered medications for this visit.       Review of Systems     GENERAL HEALTH: feels well otherwise  SKIN: denies any unusual skin lesions or rashes  RESPIRATORY: denies shortness of breath with exertion  CARDIOVASCULAR: denies chest pain on exertion  GI: denies abdominal pain and denies heartburn  : denies any burning with urination, urinary frequency or urgency  NEURO: denies headaches, numbness or tingling, mental status changes  PSYCH: denies depressed mood, anxiety  MUSC: denies muscle aches, joint pain    Physical Exam     /74   Pulse 71   Temp 97.3 °F (36.3 °C) (Temporal)   Resp 18   Wt 168 lb 3.2 oz (76.3 kg)   SpO2 99%   BMI 24.84 kg/m²     GENERAL: well developed, well nourished,in no apparent distress  SKIN: no rashes,no suspicious lesions  HEENT: atraumatic, normocephalic,ears and throat are clear  NECK:  supple,no adenopathy,no bruits  LUNGS: clear to auscultation  CARDIO: RRR without murmur  EXTREMITIES: no cyanosis, clubbing or edema  Neuro: No dysdiadochokinesia.  Finger-to-nose test slightly abnormal as patient does have a tremor in both hands at height of extension of his upper extremity.  Noted also to have a pill-rolling tremor, worse on the right than the left.    Assessment and Plan     Assessment & Plan  Tremor  Electrolytes and thyroid function checked 1/30/2025.  No abnormality seen there.  Patient is not on medications that would induce the tremor.  As the tremor is overwhelmingly asymmetrical, and does have a pill-rolling quality to it, will refer to neurology.  Orders:    Neuro Referral - In Network         Current Medications[6]    Requested Prescriptions      No prescriptions requested or ordered in this encounter       No orders of the defined types were placed in this encounter.      No follow-ups on file.    The patient indicates understanding of these issues and agrees to the plan.    Electronically signed by Arabella Moss MD 05/02/25             [1]   Past Medical History:   ASHD (arteriosclerotic heart disease)    Subclinical; cardiac CT 2-24 with calcium score 630 (LAD greater than RCA greater than LCx)    Erectile dysfunction    Thrombocytopenia   [2]   Past Surgical History:  Procedure Laterality Date    Colonoscopy N/A 4/27/2022    Procedure: COLONOSCOPY;  Surgeon: Guero Corey MD;  Location: [facility] ENDOSCOPY    Foot surgery Right 2003    Vasectomy  1983   [3]   Family History  Problem Relation Age of Onset    Breast Cancer Mother     Stroke Mother         Age 86    Other (Tongue Cancer) Mother     Heart Disease Father         MI age 85    Colon Cancer Father         Age 80s    Other (Non-Hodgkin's Lymphoma) Sister     Diabetes Sister    [4]   Social History  Socioeconomic History    Marital status:    Tobacco Use    Smoking status: Never    Smokeless tobacco: Never   Vaping Use     Vaping status: Never Used   Substance and Sexual Activity    Alcohol use: No    Drug use: No    Sexual activity: Not Currently     Partners: Female   Other Topics Concern    Caffeine Concern Yes     Comment: coffee     Social Drivers of Health     Food Insecurity: No Food Insecurity (4/7/2025)    NCSS - Food Insecurity     Worried About Running Out of Food in the Last Year: No     Ran Out of Food in the Last Year: No   Transportation Needs: No Transportation Needs (4/7/2025)    NCSS - Transportation     Lack of Transportation: No   Housing Stability: Not At Risk (4/7/2025)    NCSS - Housing/Utilities     Has Housing: Yes     Worried About Losing Housing: No     Unable to Get Utilities: No   [5]   Allergies  Allergen Reactions    Gold Salts RASH   [6]    rosuvastatin 5 MG Oral Tab Take 1 tablet (5 mg total) by mouth nightly.      saccharomyces boulardii 250 MG Oral Cap Take 1 capsule (250 mg total) by mouth daily.      Multiple Vitamins-Minerals (MULTIVITAMIN OR) Take 1 tablet by mouth daily.

## 2025-05-06 ENCOUNTER — MED REC SCAN ONLY (OUTPATIENT)
Dept: INTERNAL MEDICINE CLINIC | Facility: CLINIC | Age: 75
End: 2025-05-06

## 2025-05-12 ENCOUNTER — TELEPHONE (OUTPATIENT)
Dept: INTERNAL MEDICINE CLINIC | Facility: CLINIC | Age: 75
End: 2025-05-12

## 2025-05-12 NOTE — TELEPHONE ENCOUNTER
[Last office visit was on 5/2/25 with Dr. Moss]    Patient called states the soonest visit Dr. Stephens/Neurologist has is not until 7/10/25. He wants to make sure Dr. Moss is aware of above, to also ask if she thinks he needs a sooner visit so we can assist. Patient verbalized understanding. No further questions or concerns at this time.    Future Appointments   Date Time Provider Department Center   7/10/2025  2:40 PM Jarred Stephens DO [facility]     Dr. Moss - please advise if patient needs to be seen by Dr. Stephens sooner

## 2025-05-12 NOTE — TELEPHONE ENCOUNTER
Patient contacted and made aware of Dr. Moss's note below. Patient verbalized understanding. No further questions or concerns at this time.

## 2025-06-20 ENCOUNTER — MED REC SCAN ONLY (OUTPATIENT)
Dept: INTERNAL MEDICINE CLINIC | Facility: CLINIC | Age: 75
End: 2025-06-20

## 2025-07-10 ENCOUNTER — OFFICE VISIT (OUTPATIENT)
Dept: NEUROLOGY | Facility: CLINIC | Age: 75
End: 2025-07-10

## 2025-07-10 VITALS — SYSTOLIC BLOOD PRESSURE: 131 MMHG | DIASTOLIC BLOOD PRESSURE: 76 MMHG | HEART RATE: 77 BPM

## 2025-07-10 DIAGNOSIS — G20.A1 PARKINSON'S DISEASE, UNSPECIFIED WHETHER DYSKINESIA PRESENT, UNSPECIFIED WHETHER MANIFESTATIONS FLUCTUATE (HCC): Primary | ICD-10-CM

## 2025-07-10 PROCEDURE — 99204 OFFICE O/P NEW MOD 45 MIN: CPT | Performed by: OTHER

## 2025-07-10 RX ORDER — CARBIDOPA AND LEVODOPA 25; 100 MG/1; MG/1
TABLET ORAL
Qty: 249 TABLET | Refills: 0 | Status: SHIPPED | OUTPATIENT
Start: 2025-07-10 | End: 2025-10-08

## 2025-07-10 RX ORDER — POLYETHYLENE GLYCOL 3350 17 G/17G
7 POWDER, FOR SOLUTION ORAL EVERY OTHER DAY
Qty: 19 PACKET | Refills: 0 | Status: SHIPPED | OUTPATIENT
Start: 2025-07-10 | End: 2025-10-08

## 2025-07-31 ENCOUNTER — OFFICE VISIT (OUTPATIENT)
Dept: SPEECH THERAPY | Facility: HOSPITAL | Age: 75
End: 2025-07-31
Attending: Other
Payer: MEDICARE

## 2025-07-31 ENCOUNTER — TELEPHONE (OUTPATIENT)
Dept: NEUROLOGY | Facility: CLINIC | Age: 75
End: 2025-07-31

## 2025-07-31 DIAGNOSIS — G20.A1 PARKINSON'S DISEASE, UNSPECIFIED WHETHER DYSKINESIA PRESENT, UNSPECIFIED WHETHER MANIFESTATIONS FLUCTUATE (HCC): Primary | ICD-10-CM

## 2025-07-31 PROCEDURE — 92524 BEHAVRAL QUALIT ANALYS VOICE: CPT

## 2025-08-06 ENCOUNTER — PATIENT MESSAGE (OUTPATIENT)
Dept: NEUROLOGY | Facility: CLINIC | Age: 75
End: 2025-08-06

## 2025-08-06 ENCOUNTER — TELEPHONE (OUTPATIENT)
Dept: PHYSICAL THERAPY | Facility: HOSPITAL | Age: 75
End: 2025-08-06

## 2025-08-06 DIAGNOSIS — G20.A1 PARKINSON'S DISEASE, UNSPECIFIED WHETHER DYSKINESIA PRESENT, UNSPECIFIED WHETHER MANIFESTATIONS FLUCTUATE (HCC): ICD-10-CM

## 2025-08-14 ENCOUNTER — OFFICE VISIT (OUTPATIENT)
Dept: SPEECH THERAPY | Facility: HOSPITAL | Age: 75
End: 2025-08-14
Attending: Other

## 2025-08-14 DIAGNOSIS — G20.A1 PARKINSON'S DISEASE, UNSPECIFIED WHETHER DYSKINESIA PRESENT, UNSPECIFIED WHETHER MANIFESTATIONS FLUCTUATE (HCC): Primary | ICD-10-CM

## 2025-08-14 PROCEDURE — 92507 TX SP LANG VOICE COMM INDIV: CPT

## 2025-08-20 ENCOUNTER — OFFICE VISIT (OUTPATIENT)
Dept: PHYSICAL THERAPY | Facility: HOSPITAL | Age: 75
End: 2025-08-20
Attending: INTERNAL MEDICINE

## 2025-08-20 DIAGNOSIS — G20.A1 PARKINSON'S DISEASE, UNSPECIFIED WHETHER DYSKINESIA PRESENT, UNSPECIFIED WHETHER MANIFESTATIONS FLUCTUATE (HCC): Primary | ICD-10-CM

## 2025-08-20 PROCEDURE — 97161 PT EVAL LOW COMPLEX 20 MIN: CPT

## 2025-08-20 PROCEDURE — 97110 THERAPEUTIC EXERCISES: CPT

## 2025-08-25 ENCOUNTER — OFFICE VISIT (OUTPATIENT)
Dept: PHYSICAL THERAPY | Facility: HOSPITAL | Age: 75
End: 2025-08-25
Attending: INTERNAL MEDICINE

## 2025-08-25 PROCEDURE — 97112 NEUROMUSCULAR REEDUCATION: CPT

## 2025-08-25 PROCEDURE — 97110 THERAPEUTIC EXERCISES: CPT

## 2025-08-27 ENCOUNTER — APPOINTMENT (OUTPATIENT)
Dept: PHYSICAL THERAPY | Facility: HOSPITAL | Age: 75
End: 2025-08-27
Attending: INTERNAL MEDICINE

## (undated) DEVICE — 35 ML SYRINGE REGULAR TIP: Brand: MONOJECT

## (undated) DEVICE — MEDI-VAC NON-CONDUCTIVE SUCTION TUBING 6MM X 1.8M (6FT.) L: Brand: CARDINAL HEALTH

## (undated) DEVICE — KIT CLEAN ENDOKIT 1.1OZ GOWNX2

## (undated) DEVICE — KIT ENDO ORCAPOD 160/180/190

## (undated) DEVICE — LINE MNTR ADLT SET O2 INTMD

## (undated) NOTE — LETTER
[facility]  Administration of Anesthesia  1. I, Renata Blankenship., or _________________________________ acting on his behalf, (Patient) (Dependent/Representative) request to receive anesthesia for my pending procedure/operation/treatment. A physician (anesthesiologist) alone or an anesthesiologist working with a nurse anesthetist may administer my anesthesia. 2. I understand that my anesthesiologist is not an employee or agent of the hospital, but is an independent medical practitioner who has been permitted to use its facilities for the care and treatment of his/her patients. 3. I acknowledge that a physician from [facility] or their designate(s), recommended anesthesia for me using her/his medical judgment. The type(s) of anesthesia I may receive include:                a) General Anesthesia, b) Spinal/Epidural Anesthesia, c) Regional Anesthesia or d) Monitored Anesthesia Care. 4. If my spinal, regional or monitored anesthesia care (local) is not satisfactory for my comfort, or if my medical condition requires, I consent to the administration of general anesthesia. 5. I am aware that the practice of anesthesiology is not an exact science and that some foreseeable risks or consequences may occur. Some common risks/consequences include sore throat and hoarseness, nausea and vomiting, muscle soreness, backache, damage to the mouth/teeth/vocal cords and eye injury. I understand that more rare but serious potential risks of anesthesia include blood pressure changes, drug reactions, cardiac arrest, brain damage, paralysis or death. These risks apply to whether I have general, spinal/epidural, regional or monitored anesthesia care. 6. OBSTETRIC PATIENTS: Specific risks/consequences of spinal/epidural anesthesia may include itching, low blood pressure, difficulty urinating, slowing of the baby's heart rate and headache.  Rare risks include infections, high spinal block, spinal bleeding, seizure, cardiac arrest and death. 7. AWARENESS: I understand that it is possible (but unlikely) to have explicit memory of events from the operating room while under general anesthesia. 8. ELECTROCONVULSIVE THERAPY PATIENTS: This consent serves for all treatments in a single course of therapy. 9. I understand that I must inform my anesthesiologist when I last ate and/or drank to minimize the risk of anesthesia. 10. If I am pregnant, or may pregnant, I understand that elective surgery should be postponed until after the baby is born. Anesthetics cross the placenta and may temporarily anesthetize the baby. Although fetal complications of anesthesia during pregnancy are rare, they may include birth defects, premature labor, brain damage and death. 11. I certify that I informed the anesthesiologist, to the best of my ability, about medication I take including blood thinners, anticoagulants, herbal remedies, narcotics and recreational drugs (e.g. cocaine, marijuana, PCP). Failure to inform my anesthesiologist about these medicines may increase my risk of anesthetic complications. The nature and purpose of my anesthetic management was explained to me. I had the opportunity to ask questions and the answers and information provided meet my satisfaction.   I retain the right to withdraw this consent at any time prior to the administration of said anesthetic.    ___________________________________________________           _____________________________________________________  Patient Signature                                                                                      Witness Signature                ___________________________________________________           _____________________________________________________  Date/Time                                                                                               Responsible person in case of minor/ unconscious pt /Relationship    My signature below affirms that prior to the time of the procedure, I have explained to the patient and/or his/her guardian, the risks and benefits of undergoing anesthesia, as well as any reasonable alternatives. ___________________________________________________            _____________________________________________________  Physician Signature                            Date/Time  Patient Name: Chen Velasco.      : 3/14/1950     Printed: 2022      Medical Record #: [id number]                              Page 1 of 1    ----------ANESTHESIA CONSENT----------

## (undated) NOTE — LETTER
[address], [place]  Authorization for Invasive Procedures  1. I hereby authorize Dr. Ron Renteria , my physician and whomever may be designated as the doctor's assistant, to perform the following operation and/or procedure:  Colonoscopy on K[address]idy [address]. at [facility].    2. My physician has explained to me the nature and purpose of the operation or other procedure, possible alternative methods of treatment, the risks involved and the possibility of complications to me. I understand the probable consequences of declining the recommended procedure and the alternative methods of treatment. I acknowledge that no guarantee has been made as to the result that may be obtained. 3. I recognize that during the course of this operation or other procedure, unforeseen conditions may necessitate additional or different procedures than those listed above. I, therefore, further authorize and request that the above-named physician, his/her physician assistants, or designees perform such procedures as are, in his/her professional opinion, necessary and desirable. If I have a Do Not Attempt Resuscitation (DNAR) order in place, that status will be suspended while in the operating room, procedural suite, and during the recovery period unless otherwise explicitly stated by me (or a person authorized to consent on my behalf). The surgeon or my attending physician will determine when the applicable recovery period ends for purposes of reinstating the DNAR order. 4. Should the need arise during my operation or immediate post-operative period; I also consent to the administration of blood and/or blood products.  Further, I understand that despite careful testing and screening of blood and blood products, I may still be subject to ill effects as a result of recieving a blood transfusion an/or blood producst. The following are some, but not all, of the potential risks that can occur: fever and allergic reactions, hemolytic reactions, transmission of disease such as hepatitis, AIDS, cytomegalovirus (CMV), and flluid overload. In the event that I wish to have autologous transfusions of my own blood, or a directed donor transfusion, I will discuss this with my physician. 5. I consent to the photographing of the operations or procedures to be performed for the purposes of advancing medicine, science, and/or education, provided my identity is not revealed. If the procedure has been videotaped, the physician/surgeon will obtain the original videotape. The hospital will not be responsible for storage or maintenance of this tape. 6. I consent to the presence of a  or observer as deemed necessary by my physician or his designee. 7. Any tissues or organs removed in the operation or other procedure may be disposed of by and at the discretion of [facility].    8. I understand that the physician and his/her physician assistants may not be employees or agents of [facility], [facility], nor [facility], but are independent medical practitioners who have been permitted to use its facilities for the care and treatment of their patients. 9. Patients having a sterilization procedure: I understand that if the procedure is successful the results will be permanent and it will therefore be impossible for me to inseminate, conceive or bear children. I also understand that the procedure is intended to result in sterility, although the result has not been guaranteed. 10. I CERTIFY THAT I HAVE READ AND FULLY UNDERSTAND THE ABOVE CONSENT TO OPERATION and/or OTHER PROCEDURE. 11. I acknowledge that my physician has explained sedation/analgesia administration to me including the risks and benefits. I consent to the administration of sedation/analgesia as may be necessary or desirable in the judgment of my physician. Signature of Patient:  ________________________________________________ Date: _________Time: _________    Responsible person in case of minor or unconscious: _____________________________Relationship: ____________     Witness Signature: ____________________________________________ Date: __________ Time: ___________    Statement of Physician  My signature below affirms that prior to the time of the procedure, I have explained to the patient and/or his legal representative, the risks and benefits involved in the proposed treatment and any reasonable alternative to the proposed treatment. I have also explained the risks and benefits involved in the refusal of the proposed treatment and have answered the patient's questions. If I have a significant financial interest in this procedure/surgery, I have disclosed this and had a discussion with my patient. Signature of Physician:   ________________________________________Date: _________Time:_______ Patient Name: Juani Correa.   : 3/14/1950   Printed: 2022    Medical Record #: [id number]